# Patient Record
Sex: MALE | Race: WHITE | NOT HISPANIC OR LATINO | Employment: OTHER | URBAN - METROPOLITAN AREA
[De-identification: names, ages, dates, MRNs, and addresses within clinical notes are randomized per-mention and may not be internally consistent; named-entity substitution may affect disease eponyms.]

---

## 2017-07-13 ENCOUNTER — HISTORICAL (OUTPATIENT)
Dept: ADMINISTRATIVE | Facility: HOSPITAL | Age: 57
End: 2017-07-13

## 2017-07-13 LAB
ABS NEUT (OLG): 3.49 X10(3)/MCL (ref 2.1–9.2)
ALBUMIN SERPL-MCNC: 4.3 GM/DL (ref 3.4–5)
ALBUMIN/GLOB SERPL: 1.8 {RATIO}
ALP SERPL-CCNC: 36 UNIT/L (ref 50–136)
ALT SERPL-CCNC: 45 UNIT/L (ref 12–78)
AST SERPL-CCNC: 22 UNIT/L (ref 15–37)
BASOPHILS # BLD AUTO: 0 X10(3)/MCL (ref 0–0.2)
BASOPHILS NFR BLD AUTO: 0.8 %
BILIRUB SERPL-MCNC: 0.5 MG/DL (ref 0.2–1)
BILIRUBIN DIRECT+TOT PNL SERPL-MCNC: 0.1 MG/DL (ref 0–0.2)
BILIRUBIN DIRECT+TOT PNL SERPL-MCNC: 0.4 MG/DL (ref 0–0.8)
BUN SERPL-MCNC: 17 MG/DL (ref 7–18)
CALCIUM SERPL-MCNC: 9.4 MG/DL (ref 8.5–10.1)
CHLORIDE SERPL-SCNC: 109 MMOL/L (ref 98–107)
CO2 SERPL-SCNC: 28 MMOL/L (ref 21–32)
CREAT SERPL-MCNC: 1.04 MG/DL (ref 0.7–1.3)
EOSINOPHIL # BLD AUTO: 0.4 X10(3)/MCL (ref 0–0.9)
EOSINOPHIL NFR BLD AUTO: 6.4 %
ERYTHROCYTE [DISTWIDTH] IN BLOOD BY AUTOMATED COUNT: 13.2 % (ref 11.5–17)
GLOBULIN SER-MCNC: 2.4 GM/DL (ref 2.4–3.5)
GLUCOSE SERPL-MCNC: 124 MG/DL (ref 74–106)
HCT VFR BLD AUTO: 39.2 % (ref 42–52)
HGB BLD-MCNC: 13.2 GM/DL (ref 14–18)
LYMPHOCYTES # BLD AUTO: 1.9 X10(3)/MCL (ref 0.6–4.6)
LYMPHOCYTES NFR BLD AUTO: 29.4 %
MCH RBC QN AUTO: 31.1 PG (ref 27–31)
MCHC RBC AUTO-ENTMCNC: 33.7 GM/DL (ref 33–36)
MCV RBC AUTO: 92.2 FL (ref 80–94)
MONOCYTES # BLD AUTO: 0.6 X10(3)/MCL (ref 0.1–1.3)
MONOCYTES NFR BLD AUTO: 10 %
NEUTROPHILS # BLD AUTO: 3.5 X10(3)/MCL (ref 2.1–9.2)
NEUTROPHILS NFR BLD AUTO: 53.4 %
PLATELET # BLD AUTO: 182 X10(3)/MCL (ref 130–400)
PMV BLD AUTO: 10.2 FL (ref 9.4–12.4)
POTASSIUM SERPL-SCNC: 4.6 MMOL/L (ref 3.5–5.1)
PROT SERPL-MCNC: 6.7 GM/DL (ref 6.4–8.2)
PSA SERPL-MCNC: 0.13 NG/ML (ref 0–4)
RBC # BLD AUTO: 4.25 X10(6)/MCL (ref 4.7–6.1)
SODIUM SERPL-SCNC: 141 MMOL/L (ref 136–145)
WBC # SPEC AUTO: 6.5 X10(3)/MCL (ref 4.5–11.5)

## 2017-07-19 ENCOUNTER — HISTORICAL (OUTPATIENT)
Dept: ADMINISTRATIVE | Facility: HOSPITAL | Age: 57
End: 2017-07-19

## 2017-09-11 ENCOUNTER — HISTORICAL (OUTPATIENT)
Dept: INFUSION THERAPY | Facility: HOSPITAL | Age: 57
End: 2017-09-11

## 2017-09-11 LAB
ANION GAP SERPL CALC-SCNC: 16 MMOL/L
BUN SERPL-MCNC: 14 MG/DL (ref 7–18)
CHLORIDE SERPL-SCNC: 106 MMOL/L (ref 98–109)
CREAT SERPL-MCNC: 0.9 MG/DL (ref 0.6–1.3)
GLUCOSE SERPL-MCNC: 106 MG/DL (ref 70–105)
HCT VFR BLD CALC: 40 % (ref 38–51)
HGB BLD-MCNC: 13.6 MG/DL (ref 12–17)
POC IONIZED CALCIUM: 1.18 MMOL/L (ref 1.12–1.32)
POC TCO2: 24 MMOL/L (ref 22–27)
POTASSIUM BLD-SCNC: 3.8 MMOL/L (ref 3.5–4.9)
SODIUM BLD-SCNC: 141 MMOL/L (ref 138–146)

## 2017-10-27 ENCOUNTER — HISTORICAL (OUTPATIENT)
Dept: HEMATOLOGY/ONCOLOGY | Facility: CLINIC | Age: 57
End: 2017-10-27

## 2017-10-27 LAB
ABS NEUT (OLG): 4.1 X10(3)/MCL (ref 2.1–9.2)
ALBUMIN SERPL-MCNC: 4.5 GM/DL (ref 3.4–5)
ALBUMIN/GLOB SERPL: 1.7 {RATIO}
ALP SERPL-CCNC: 37 UNIT/L (ref 50–136)
ALT SERPL-CCNC: 40 UNIT/L (ref 12–78)
AST SERPL-CCNC: 20 UNIT/L (ref 15–37)
BASOPHILS # BLD AUTO: 0 X10(3)/MCL (ref 0–0.2)
BASOPHILS NFR BLD AUTO: 0.6 %
BILIRUB SERPL-MCNC: 0.5 MG/DL (ref 0.2–1)
BILIRUBIN DIRECT+TOT PNL SERPL-MCNC: 0.1 MG/DL (ref 0–0.2)
BILIRUBIN DIRECT+TOT PNL SERPL-MCNC: 0.4 MG/DL (ref 0–0.8)
BUN SERPL-MCNC: 22 MG/DL (ref 7–18)
CALCIUM SERPL-MCNC: 9.5 MG/DL (ref 8.5–10.1)
CHLORIDE SERPL-SCNC: 105 MMOL/L (ref 98–107)
CO2 SERPL-SCNC: 24 MMOL/L (ref 21–32)
CREAT SERPL-MCNC: 0.98 MG/DL (ref 0.7–1.3)
EOSINOPHIL # BLD AUTO: 0.7 X10(3)/MCL (ref 0–0.9)
EOSINOPHIL NFR BLD AUTO: 8.4 %
ERYTHROCYTE [DISTWIDTH] IN BLOOD BY AUTOMATED COUNT: 13.1 % (ref 11.5–17)
GLOBULIN SER-MCNC: 2.7 GM/DL (ref 2.4–3.5)
GLUCOSE SERPL-MCNC: 88 MG/DL (ref 74–106)
HCT VFR BLD AUTO: 40.1 % (ref 42–52)
HGB BLD-MCNC: 13.6 GM/DL (ref 14–18)
LYMPHOCYTES # BLD AUTO: 2.2 X10(3)/MCL (ref 0.6–4.6)
LYMPHOCYTES NFR BLD AUTO: 27.7 %
MCH RBC QN AUTO: 31 PG (ref 27–31)
MCHC RBC AUTO-ENTMCNC: 33.9 GM/DL (ref 33–36)
MCV RBC AUTO: 91.3 FL (ref 80–94)
MONOCYTES # BLD AUTO: 0.8 X10(3)/MCL (ref 0.1–1.3)
MONOCYTES NFR BLD AUTO: 10.9 %
NEUTROPHILS # BLD AUTO: 4.1 X10(3)/MCL (ref 2.1–9.2)
NEUTROPHILS NFR BLD AUTO: 52.4 %
PLATELET # BLD AUTO: 201 X10(3)/MCL (ref 130–400)
PMV BLD AUTO: 9.9 FL (ref 9.4–12.4)
POTASSIUM SERPL-SCNC: 4.2 MMOL/L (ref 3.5–5.1)
PROT SERPL-MCNC: 7.2 GM/DL (ref 6.4–8.2)
PSA SERPL-MCNC: 0.22 NG/ML (ref 0–4)
RBC # BLD AUTO: 4.39 X10(6)/MCL (ref 4.7–6.1)
SODIUM SERPL-SCNC: 139 MMOL/L (ref 136–145)
WBC # SPEC AUTO: 7.8 X10(3)/MCL (ref 4.5–11.5)

## 2017-12-05 ENCOUNTER — HISTORICAL (OUTPATIENT)
Dept: INFUSION THERAPY | Facility: HOSPITAL | Age: 57
End: 2017-12-05

## 2017-12-05 LAB
ANION GAP SERPL CALC-SCNC: 17 MMOL/L
BUN SERPL-MCNC: 19 MG/DL (ref 7–18)
CHLORIDE SERPL-SCNC: 106 MMOL/L (ref 98–109)
CREAT SERPL-MCNC: 1 MG/DL (ref 0.6–1.3)
GLUCOSE SERPL-MCNC: 147 MG/DL (ref 70–105)
HCT VFR BLD CALC: 37 % (ref 38–51)
HGB BLD-MCNC: 12.6 MG/DL (ref 12–17)
POC IONIZED CALCIUM: 1.21 MMOL/L (ref 1.12–1.32)
POC TCO2: 24 MMOL/L (ref 22–27)
POTASSIUM BLD-SCNC: 4 MMOL/L (ref 3.5–4.9)
SODIUM BLD-SCNC: 143 MMOL/L (ref 138–146)

## 2017-12-13 ENCOUNTER — HISTORICAL (OUTPATIENT)
Dept: RESPIRATORY THERAPY | Facility: HOSPITAL | Age: 57
End: 2017-12-13

## 2018-01-25 ENCOUNTER — HISTORICAL (OUTPATIENT)
Dept: HEMATOLOGY/ONCOLOGY | Facility: CLINIC | Age: 58
End: 2018-01-25

## 2018-01-25 LAB
ABS NEUT (OLG): 4 X10(3)/MCL (ref 2.1–9.2)
ALBUMIN SERPL-MCNC: 4.2 GM/DL (ref 3.4–5)
ALBUMIN/GLOB SERPL: 1.4 {RATIO}
ALP SERPL-CCNC: 42 UNIT/L (ref 50–136)
ALT SERPL-CCNC: 46 UNIT/L (ref 12–78)
AST SERPL-CCNC: 27 UNIT/L (ref 15–37)
BASOPHILS # BLD AUTO: 0 X10(3)/MCL (ref 0–0.2)
BASOPHILS NFR BLD AUTO: 0.6 %
BILIRUB SERPL-MCNC: 0.4 MG/DL (ref 0.2–1)
BILIRUBIN DIRECT+TOT PNL SERPL-MCNC: 0.1 MG/DL (ref 0–0.2)
BILIRUBIN DIRECT+TOT PNL SERPL-MCNC: 0.3 MG/DL (ref 0–0.8)
BUN SERPL-MCNC: 19 MG/DL (ref 7–18)
CALCIUM SERPL-MCNC: 8.8 MG/DL (ref 8.5–10.1)
CHLORIDE SERPL-SCNC: 107 MMOL/L (ref 98–107)
CO2 SERPL-SCNC: 22 MMOL/L (ref 21–32)
CREAT SERPL-MCNC: 1.14 MG/DL (ref 0.7–1.3)
EOSINOPHIL # BLD AUTO: 0.4 X10(3)/MCL (ref 0–0.9)
EOSINOPHIL NFR BLD AUTO: 6.1 %
ERYTHROCYTE [DISTWIDTH] IN BLOOD BY AUTOMATED COUNT: 13.3 % (ref 11.5–17)
GLOBULIN SER-MCNC: 3 GM/DL (ref 2.4–3.5)
GLUCOSE SERPL-MCNC: 128 MG/DL (ref 74–106)
HCT VFR BLD AUTO: 41.3 % (ref 42–52)
HGB BLD-MCNC: 13.8 GM/DL (ref 14–18)
LYMPHOCYTES # BLD AUTO: 2 X10(3)/MCL (ref 0.6–4.6)
LYMPHOCYTES NFR BLD AUTO: 29.4 %
MCH RBC QN AUTO: 30.7 PG (ref 27–31)
MCHC RBC AUTO-ENTMCNC: 33.4 GM/DL (ref 33–36)
MCV RBC AUTO: 91.8 FL (ref 80–94)
MONOCYTES # BLD AUTO: 0.4 X10(3)/MCL (ref 0.1–1.3)
MONOCYTES NFR BLD AUTO: 5.8 %
NEUTROPHILS # BLD AUTO: 4 X10(3)/MCL (ref 2.1–9.2)
NEUTROPHILS NFR BLD AUTO: 58.1 %
PLATELET # BLD AUTO: 211 X10(3)/MCL (ref 130–400)
PMV BLD AUTO: 9.8 FL (ref 9.4–12.4)
POTASSIUM SERPL-SCNC: 4.1 MMOL/L (ref 3.5–5.1)
PROT SERPL-MCNC: 7.2 GM/DL (ref 6.4–8.2)
PSA SERPL-MCNC: 0.38 NG/ML (ref 0–4)
RBC # BLD AUTO: 4.5 X10(6)/MCL (ref 4.7–6.1)
SODIUM SERPL-SCNC: 140 MMOL/L (ref 136–145)
WBC # SPEC AUTO: 6.9 X10(3)/MCL (ref 4.5–11.5)

## 2018-03-01 ENCOUNTER — HISTORICAL (OUTPATIENT)
Dept: HEMATOLOGY/ONCOLOGY | Facility: CLINIC | Age: 58
End: 2018-03-01

## 2018-03-01 LAB
ABS NEUT (OLG): 2.9 X10(3)/MCL (ref 2.1–9.2)
ALBUMIN SERPL-MCNC: 3.9 GM/DL (ref 3.4–5)
ALBUMIN/GLOB SERPL: 1.3 {RATIO}
ALP SERPL-CCNC: 37 UNIT/L (ref 50–136)
ALT SERPL-CCNC: 43 UNIT/L (ref 12–78)
AST SERPL-CCNC: 24 UNIT/L (ref 15–37)
BASOPHILS # BLD AUTO: 0 X10(3)/MCL (ref 0–0.2)
BASOPHILS NFR BLD AUTO: 0.7 %
BILIRUB SERPL-MCNC: 0.4 MG/DL (ref 0.2–1)
BILIRUBIN DIRECT+TOT PNL SERPL-MCNC: 0.1 MG/DL (ref 0–0.2)
BILIRUBIN DIRECT+TOT PNL SERPL-MCNC: 0.3 MG/DL (ref 0–0.8)
BUN SERPL-MCNC: 23 MG/DL (ref 7–18)
CALCIUM SERPL-MCNC: 8.9 MG/DL (ref 8.5–10.1)
CHLORIDE SERPL-SCNC: 106 MMOL/L (ref 98–107)
CO2 SERPL-SCNC: 23 MMOL/L (ref 21–32)
CREAT SERPL-MCNC: 1 MG/DL (ref 0.7–1.3)
EOSINOPHIL # BLD AUTO: 0.4 X10(3)/MCL (ref 0–0.9)
EOSINOPHIL NFR BLD AUTO: 7.7 %
ERYTHROCYTE [DISTWIDTH] IN BLOOD BY AUTOMATED COUNT: 13.3 % (ref 11.5–17)
GLOBULIN SER-MCNC: 3 GM/DL (ref 2.4–3.5)
GLUCOSE SERPL-MCNC: 115 MG/DL (ref 74–106)
HCT VFR BLD AUTO: 39.3 % (ref 42–52)
HGB BLD-MCNC: 12.9 GM/DL (ref 14–18)
LYMPHOCYTES # BLD AUTO: 1.9 X10(3)/MCL (ref 0.6–4.6)
LYMPHOCYTES NFR BLD AUTO: 32.6 %
MCH RBC QN AUTO: 30.1 PG (ref 27–31)
MCHC RBC AUTO-ENTMCNC: 32.8 GM/DL (ref 33–36)
MCV RBC AUTO: 91.6 FL (ref 80–94)
MONOCYTES # BLD AUTO: 0.5 X10(3)/MCL (ref 0.1–1.3)
MONOCYTES NFR BLD AUTO: 8.1 %
NEUTROPHILS # BLD AUTO: 2.9 X10(3)/MCL (ref 2.1–9.2)
NEUTROPHILS NFR BLD AUTO: 50.9 %
PLATELET # BLD AUTO: 183 X10(3)/MCL (ref 130–400)
PMV BLD AUTO: 9.8 FL (ref 9.4–12.4)
POC CREATININE: 1 MG/DL (ref 0.6–1.3)
POTASSIUM SERPL-SCNC: 4.4 MMOL/L (ref 3.5–5.1)
PROT SERPL-MCNC: 6.9 GM/DL (ref 6.4–8.2)
PSA SERPL-MCNC: 0.52 NG/ML (ref 0–4)
RBC # BLD AUTO: 4.29 X10(6)/MCL (ref 4.7–6.1)
SODIUM SERPL-SCNC: 137 MMOL/L (ref 136–145)
WBC # SPEC AUTO: 5.7 X10(3)/MCL (ref 4.5–11.5)

## 2018-03-07 ENCOUNTER — HISTORICAL (OUTPATIENT)
Dept: INFUSION THERAPY | Facility: HOSPITAL | Age: 58
End: 2018-03-07

## 2018-03-20 ENCOUNTER — HISTORICAL (OUTPATIENT)
Dept: RADIOLOGY | Facility: HOSPITAL | Age: 58
End: 2018-03-20

## 2018-06-05 ENCOUNTER — HISTORICAL (OUTPATIENT)
Dept: INFUSION THERAPY | Facility: HOSPITAL | Age: 58
End: 2018-06-05

## 2018-06-05 LAB
ANION GAP SERPL CALC-SCNC: 18 MMOL/L
BUN SERPL-MCNC: 23 MG/DL (ref 7–18)
CHLORIDE SERPL-SCNC: 105 MMOL/L (ref 98–109)
CREAT SERPL-MCNC: 1.1 MG/DL (ref 0.6–1.3)
GLUCOSE SERPL-MCNC: 152 MG/DL (ref 70–105)
HCT VFR BLD CALC: 34 % (ref 38–51)
HGB BLD-MCNC: 11.6 MG/DL (ref 12–17)
POC IONIZED CALCIUM: 1.15 MMOL/L (ref 1.12–1.32)
POC TCO2: 23 MMOL/L (ref 22–27)
POTASSIUM BLD-SCNC: 3.6 MMOL/L (ref 3.5–4.9)
SODIUM BLD-SCNC: 141 MMOL/L (ref 138–146)

## 2018-06-19 ENCOUNTER — HISTORICAL (OUTPATIENT)
Dept: ADMINISTRATIVE | Facility: HOSPITAL | Age: 58
End: 2018-06-19

## 2018-07-13 ENCOUNTER — HISTORICAL (OUTPATIENT)
Dept: HEMATOLOGY/ONCOLOGY | Facility: CLINIC | Age: 58
End: 2018-07-13

## 2018-07-13 LAB
ABS NEUT (OLG): 2.51 X10(3)/MCL (ref 2.1–9.2)
ALBUMIN SERPL-MCNC: 3.7 GM/DL (ref 3.4–5)
ALBUMIN/GLOB SERPL: 1.3 RATIO (ref 1.1–2)
ALP SERPL-CCNC: 44 UNIT/L (ref 50–136)
ALT SERPL-CCNC: 57 UNIT/L (ref 12–78)
AST SERPL-CCNC: 31 UNIT/L (ref 15–37)
BASOPHILS # BLD AUTO: 0 X10(3)/MCL (ref 0–0.2)
BASOPHILS NFR BLD AUTO: 0.8 %
BILIRUB SERPL-MCNC: 0.4 MG/DL (ref 0.2–1)
BILIRUBIN DIRECT+TOT PNL SERPL-MCNC: 0.1 MG/DL (ref 0–0.5)
BILIRUBIN DIRECT+TOT PNL SERPL-MCNC: 0.3 MG/DL (ref 0–0.8)
BUN SERPL-MCNC: 17 MG/DL (ref 7–18)
CALCIUM SERPL-MCNC: 9.2 MG/DL (ref 8.5–10.1)
CHLORIDE SERPL-SCNC: 110 MMOL/L (ref 98–107)
CO2 SERPL-SCNC: 24 MMOL/L (ref 21–32)
CREAT SERPL-MCNC: 0.94 MG/DL (ref 0.7–1.3)
EOSINOPHIL # BLD AUTO: 0.3 X10(3)/MCL (ref 0–0.9)
EOSINOPHIL NFR BLD AUTO: 6.8 %
ERYTHROCYTE [DISTWIDTH] IN BLOOD BY AUTOMATED COUNT: 13.9 % (ref 11.5–17)
GLOBULIN SER-MCNC: 2.9 GM/DL (ref 2.4–3.5)
GLUCOSE SERPL-MCNC: 96 MG/DL (ref 74–106)
HCT VFR BLD AUTO: 36.8 % (ref 42–52)
HGB BLD-MCNC: 12.3 GM/DL (ref 14–18)
LYMPHOCYTES # BLD AUTO: 1.3 X10(3)/MCL (ref 0.6–4.6)
LYMPHOCYTES NFR BLD AUTO: 28.2 %
MCH RBC QN AUTO: 30.8 PG (ref 27–31)
MCHC RBC AUTO-ENTMCNC: 33.4 GM/DL (ref 33–36)
MCV RBC AUTO: 92.2 FL (ref 80–94)
MONOCYTES # BLD AUTO: 0.5 X10(3)/MCL (ref 0.1–1.3)
MONOCYTES NFR BLD AUTO: 10.8 %
NEUTROPHILS # BLD AUTO: 2.5 X10(3)/MCL (ref 2.1–9.2)
NEUTROPHILS NFR BLD AUTO: 53.4 %
PLATELET # BLD AUTO: 187 X10(3)/MCL (ref 130–400)
PMV BLD AUTO: 9.5 FL (ref 9.4–12.4)
POTASSIUM SERPL-SCNC: 4.2 MMOL/L (ref 3.5–5.1)
PROT SERPL-MCNC: 6.6 GM/DL (ref 6.4–8.2)
PSA SERPL-MCNC: 1.04 NG/ML (ref 0–4)
RBC # BLD AUTO: 3.99 X10(6)/MCL (ref 4.7–6.1)
SODIUM SERPL-SCNC: 143 MMOL/L (ref 136–145)
WBC # SPEC AUTO: 4.7 X10(3)/MCL (ref 4.5–11.5)

## 2018-08-13 ENCOUNTER — HISTORICAL (OUTPATIENT)
Dept: ADMINISTRATIVE | Facility: HOSPITAL | Age: 58
End: 2018-08-13

## 2018-09-05 ENCOUNTER — HISTORICAL (OUTPATIENT)
Dept: INFUSION THERAPY | Facility: HOSPITAL | Age: 58
End: 2018-09-05

## 2018-09-05 LAB
ABS NEUT (OLG): 1.86 X10(3)/MCL (ref 2.1–9.2)
ALBUMIN SERPL-MCNC: 3.6 GM/DL (ref 3.4–5)
ALBUMIN/GLOB SERPL: 1.4 {RATIO}
ALP SERPL-CCNC: 36 UNIT/L (ref 50–136)
ALT SERPL-CCNC: 58 UNIT/L (ref 12–78)
ANION GAP SERPL CALC-SCNC: 16 MMOL/L
AST SERPL-CCNC: 28 UNIT/L (ref 15–37)
BASOPHILS # BLD AUTO: 0 X10(3)/MCL (ref 0–0.2)
BASOPHILS NFR BLD AUTO: 0.9 %
BILIRUB SERPL-MCNC: 0.5 MG/DL (ref 0.2–1)
BILIRUBIN DIRECT+TOT PNL SERPL-MCNC: 0.1 MG/DL (ref 0–0.2)
BILIRUBIN DIRECT+TOT PNL SERPL-MCNC: 0.4 MG/DL (ref 0–0.8)
BUN SERPL-MCNC: 11 MG/DL (ref 8–26)
BUN SERPL-MCNC: 13 MG/DL (ref 7–18)
CALCIUM SERPL-MCNC: 8.4 MG/DL (ref 8.5–10.1)
CHLORIDE SERPL-SCNC: 105 MMOL/L (ref 98–109)
CHLORIDE SERPL-SCNC: 107 MMOL/L (ref 98–107)
CO2 SERPL-SCNC: 25 MMOL/L (ref 21–32)
CREAT SERPL-MCNC: 1.1 MG/DL (ref 0.6–1.3)
CREAT SERPL-MCNC: 1.14 MG/DL (ref 0.7–1.3)
EOSINOPHIL # BLD AUTO: 0.2 X10(3)/MCL (ref 0–0.9)
EOSINOPHIL NFR BLD AUTO: 5.2 %
ERYTHROCYTE [DISTWIDTH] IN BLOOD BY AUTOMATED COUNT: 13.7 % (ref 11.5–17)
GLOBULIN SER-MCNC: 2.5 GM/DL (ref 2.4–3.5)
GLUCOSE SERPL-MCNC: 119 MG/DL (ref 70–105)
GLUCOSE SERPL-MCNC: 121 MG/DL (ref 74–106)
HCT VFR BLD AUTO: 35.7 % (ref 42–52)
HCT VFR BLD CALC: 34 % (ref 38–51)
HGB BLD-MCNC: 11.6 MG/DL (ref 12–17)
HGB BLD-MCNC: 12.1 GM/DL (ref 14–18)
LYMPHOCYTES # BLD AUTO: 1 X10(3)/MCL (ref 0.6–4.6)
LYMPHOCYTES NFR BLD AUTO: 30.3 %
MCH RBC QN AUTO: 30.9 PG (ref 27–31)
MCHC RBC AUTO-ENTMCNC: 33.9 GM/DL (ref 33–36)
MCV RBC AUTO: 91.3 FL (ref 80–94)
MONOCYTES # BLD AUTO: 0.3 X10(3)/MCL (ref 0.1–1.3)
MONOCYTES NFR BLD AUTO: 9.8 %
NEUTROPHILS # BLD AUTO: 1.9 X10(3)/MCL (ref 2.1–9.2)
NEUTROPHILS NFR BLD AUTO: 53.8 %
PLATELET # BLD AUTO: 186 X10(3)/MCL (ref 130–400)
PMV BLD AUTO: 9.7 FL (ref 9.4–12.4)
POC IONIZED CALCIUM: 1.1 MMOL/L (ref 1.12–1.32)
POC TCO2: 23 MMOL/L (ref 24–29)
POTASSIUM BLD-SCNC: 3.4 MMOL/L (ref 3.5–4.9)
POTASSIUM SERPL-SCNC: 3.5 MMOL/L (ref 3.5–5.1)
PROT SERPL-MCNC: 6.1 GM/DL (ref 6.4–8.2)
PSA SERPL-MCNC: 1.34 NG/ML (ref 0–4)
RBC # BLD AUTO: 3.91 X10(6)/MCL (ref 4.7–6.1)
SODIUM BLD-SCNC: 141 MMOL/L (ref 138–146)
SODIUM SERPL-SCNC: 141 MMOL/L (ref 136–145)
WBC # SPEC AUTO: 3.5 X10(3)/MCL (ref 4.5–11.5)

## 2018-10-16 ENCOUNTER — HISTORICAL (OUTPATIENT)
Dept: LAB | Facility: HOSPITAL | Age: 58
End: 2018-10-16

## 2018-12-04 ENCOUNTER — HISTORICAL (OUTPATIENT)
Dept: ADMINISTRATIVE | Facility: HOSPITAL | Age: 58
End: 2018-12-04

## 2018-12-04 LAB — POC CREATININE: 1 MG/DL (ref 0.6–1.3)

## 2018-12-05 ENCOUNTER — HISTORICAL (OUTPATIENT)
Dept: INFUSION THERAPY | Facility: HOSPITAL | Age: 58
End: 2018-12-05

## 2018-12-05 LAB
ANION GAP SERPL CALC-SCNC: 18 MMOL/L
BUN SERPL-MCNC: 16 MG/DL (ref 8–26)
CHLORIDE SERPL-SCNC: 106 MMOL/L (ref 98–109)
CREAT SERPL-MCNC: 1 MG/DL (ref 0.6–1.3)
GLUCOSE SERPL-MCNC: 121 MG/DL (ref 70–105)
HCT VFR BLD CALC: 36 % (ref 38–51)
HGB BLD-MCNC: 12.2 MG/DL (ref 12–17)
POC IONIZED CALCIUM: 1.15 MMOL/L (ref 1.12–1.32)
POC TCO2: 22 MMOL/L (ref 24–29)
POTASSIUM BLD-SCNC: 3.9 MMOL/L (ref 3.5–4.9)
SODIUM BLD-SCNC: 141 MMOL/L (ref 138–146)

## 2018-12-10 ENCOUNTER — HISTORICAL (OUTPATIENT)
Dept: HEMATOLOGY/ONCOLOGY | Facility: CLINIC | Age: 58
End: 2018-12-10

## 2018-12-10 LAB
ABS NEUT (OLG): 3.32 X10(3)/MCL (ref 2.1–9.2)
ALBUMIN SERPL-MCNC: 4 GM/DL (ref 3.4–5)
ALBUMIN/GLOB SERPL: 1.5 {RATIO}
ALP SERPL-CCNC: 39 UNIT/L (ref 50–136)
ALT SERPL-CCNC: 49 UNIT/L (ref 12–78)
AST SERPL-CCNC: 29 UNIT/L (ref 15–37)
BASOPHILS # BLD AUTO: 0 X10(3)/MCL (ref 0–0.2)
BASOPHILS NFR BLD AUTO: 0.8 %
BILIRUB SERPL-MCNC: 0.3 MG/DL (ref 0.2–1)
BILIRUBIN DIRECT+TOT PNL SERPL-MCNC: 0.1 MG/DL (ref 0–0.2)
BILIRUBIN DIRECT+TOT PNL SERPL-MCNC: 0.2 MG/DL (ref 0–0.8)
BUN SERPL-MCNC: 14 MG/DL (ref 7–18)
CALCIUM SERPL-MCNC: 8.6 MG/DL (ref 8.5–10.1)
CHLORIDE SERPL-SCNC: 109 MMOL/L (ref 98–107)
CO2 SERPL-SCNC: 24 MMOL/L (ref 21–32)
CREAT SERPL-MCNC: 1.09 MG/DL (ref 0.7–1.3)
EOSINOPHIL # BLD AUTO: 0.3 X10(3)/MCL (ref 0–0.9)
EOSINOPHIL NFR BLD AUTO: 5.3 %
ERYTHROCYTE [DISTWIDTH] IN BLOOD BY AUTOMATED COUNT: 13 % (ref 11.5–17)
GLOBULIN SER-MCNC: 2.7 GM/DL (ref 2.4–3.5)
GLUCOSE SERPL-MCNC: 119 MG/DL (ref 74–106)
HCT VFR BLD AUTO: 38.7 % (ref 42–52)
HGB BLD-MCNC: 12.9 GM/DL (ref 14–18)
LYMPHOCYTES # BLD AUTO: 1.2 X10(3)/MCL (ref 0.6–4.6)
LYMPHOCYTES NFR BLD AUTO: 23.5 %
MCH RBC QN AUTO: 31.5 PG (ref 27–31)
MCHC RBC AUTO-ENTMCNC: 33.3 GM/DL (ref 33–36)
MCV RBC AUTO: 94.4 FL (ref 80–94)
MONOCYTES # BLD AUTO: 0.4 X10(3)/MCL (ref 0.1–1.3)
MONOCYTES NFR BLD AUTO: 6.8 %
NEUTROPHILS # BLD AUTO: 3.3 X10(3)/MCL (ref 2.1–9.2)
NEUTROPHILS NFR BLD AUTO: 63 %
PLATELET # BLD AUTO: 205 X10(3)/MCL (ref 130–400)
PMV BLD AUTO: 9.4 FL (ref 9.4–12.4)
POTASSIUM SERPL-SCNC: 3.8 MMOL/L (ref 3.5–5.1)
PROT SERPL-MCNC: 6.7 GM/DL (ref 6.4–8.2)
PSA SERPL-MCNC: 1.84 NG/ML (ref 0–4)
RBC # BLD AUTO: 4.1 X10(6)/MCL (ref 4.7–6.1)
SODIUM SERPL-SCNC: 141 MMOL/L (ref 136–145)
WBC # SPEC AUTO: 5.3 X10(3)/MCL (ref 4.5–11.5)

## 2019-03-06 ENCOUNTER — HISTORICAL (OUTPATIENT)
Dept: INFUSION THERAPY | Facility: HOSPITAL | Age: 59
End: 2019-03-06

## 2019-03-06 LAB
ABS NEUT (OLG): 3.11 X10(3)/MCL (ref 2.1–9.2)
ALBUMIN SERPL-MCNC: 3.8 GM/DL (ref 3.4–5)
ALP SERPL-CCNC: 41 UNIT/L (ref 50–136)
ALT SERPL-CCNC: 48 UNIT/L (ref 12–78)
ANION GAP SERPL CALC-SCNC: 16 MMOL/L
AST SERPL-CCNC: 27 UNIT/L (ref 15–37)
BASOPHILS # BLD AUTO: 0 X10(3)/MCL (ref 0–0.2)
BASOPHILS NFR BLD AUTO: 0.6 %
BILIRUB SERPL-MCNC: 0.3 MG/DL (ref 0.2–1)
BILIRUBIN DIRECT+TOT PNL SERPL-MCNC: 0.1 MG/DL (ref 0–0.2)
BILIRUBIN DIRECT+TOT PNL SERPL-MCNC: 0.2 MG/DL (ref 0–0.8)
BUN SERPL-MCNC: 12 MG/DL (ref 8–26)
CHLORIDE SERPL-SCNC: 107 MMOL/L (ref 98–109)
CREAT SERPL-MCNC: 0.8 MG/DL (ref 0.6–1.3)
EOSINOPHIL # BLD AUTO: 0.3 X10(3)/MCL (ref 0–0.9)
EOSINOPHIL NFR BLD AUTO: 5.6 %
ERYTHROCYTE [DISTWIDTH] IN BLOOD BY AUTOMATED COUNT: 13.4 % (ref 11.5–17)
GLUCOSE SERPL-MCNC: 118 MG/DL (ref 70–105)
HCT VFR BLD AUTO: 39.5 % (ref 42–52)
HCT VFR BLD CALC: 38 % (ref 38–51)
HGB BLD-MCNC: 12.9 MG/DL (ref 12–17)
HGB BLD-MCNC: 13.2 GM/DL (ref 14–18)
LIVER PROFILE INTERP: ABNORMAL
LYMPHOCYTES # BLD AUTO: 1.3 X10(3)/MCL (ref 0.6–4.6)
LYMPHOCYTES NFR BLD AUTO: 25.6 %
MCH RBC QN AUTO: 31.1 PG (ref 27–31)
MCHC RBC AUTO-ENTMCNC: 33.4 GM/DL (ref 33–36)
MCV RBC AUTO: 93.2 FL (ref 80–94)
MONOCYTES # BLD AUTO: 0.4 X10(3)/MCL (ref 0.1–1.3)
MONOCYTES NFR BLD AUTO: 7.8 %
NEUTROPHILS # BLD AUTO: 3.1 X10(3)/MCL (ref 2.1–9.2)
NEUTROPHILS NFR BLD AUTO: 60.2 %
PLATELET # BLD AUTO: 184 X10(3)/MCL (ref 130–400)
PMV BLD AUTO: 9 FL (ref 9.4–12.4)
POC IONIZED CALCIUM: 1.13 MMOL/L (ref 1.12–1.32)
POC TCO2: 23 MMOL/L (ref 24–29)
POTASSIUM BLD-SCNC: 3.8 MMOL/L (ref 3.5–4.9)
PROT SERPL-MCNC: 6.5 GM/DL (ref 6.4–8.2)
PSA SERPL-MCNC: 0.41 NG/ML (ref 0–4)
RBC # BLD AUTO: 4.24 X10(6)/MCL (ref 4.7–6.1)
SODIUM BLD-SCNC: 141 MMOL/L (ref 138–146)
WBC # SPEC AUTO: 5.2 X10(3)/MCL (ref 4.5–11.5)

## 2019-04-08 ENCOUNTER — HISTORICAL (OUTPATIENT)
Dept: LAB | Facility: HOSPITAL | Age: 59
End: 2019-04-08

## 2019-06-05 ENCOUNTER — HISTORICAL (OUTPATIENT)
Dept: INFUSION THERAPY | Facility: HOSPITAL | Age: 59
End: 2019-06-05

## 2019-06-05 LAB
ABS NEUT (OLG): 2.29 X10(3)/MCL (ref 2.1–9.2)
ALBUMIN SERPL-MCNC: 3.7 GM/DL (ref 3.4–5)
ALP SERPL-CCNC: 41 UNIT/L (ref 50–136)
ALT SERPL-CCNC: 43 UNIT/L (ref 12–78)
ANION GAP SERPL CALC-SCNC: 18 MMOL/L
AST SERPL-CCNC: 24 UNIT/L (ref 15–37)
BASOPHILS # BLD AUTO: 0 X10(3)/MCL (ref 0–0.2)
BASOPHILS NFR BLD AUTO: 0.9 %
BILIRUB SERPL-MCNC: 0.5 MG/DL (ref 0.2–1)
BILIRUBIN DIRECT+TOT PNL SERPL-MCNC: 0.1 MG/DL (ref 0–0.2)
BILIRUBIN DIRECT+TOT PNL SERPL-MCNC: 0.4 MG/DL (ref 0–0.8)
BUN SERPL-MCNC: 15 MG/DL (ref 8–26)
CHLORIDE SERPL-SCNC: 104 MMOL/L (ref 98–109)
CREAT SERPL-MCNC: 0.9 MG/DL (ref 0.6–1.3)
EOSINOPHIL # BLD AUTO: 0.2 X10(3)/MCL (ref 0–0.9)
EOSINOPHIL NFR BLD AUTO: 5.7 %
EOSINOPHIL NFR BLD MANUAL: 4 % (ref 0–8)
ERYTHROCYTE [DISTWIDTH] IN BLOOD BY AUTOMATED COUNT: 13 % (ref 11.5–17)
GLUCOSE SERPL-MCNC: 127 MG/DL (ref 70–105)
HCT VFR BLD AUTO: 39.8 % (ref 42–52)
HCT VFR BLD CALC: 39 % (ref 38–51)
HGB BLD-MCNC: 13.2 GM/DL (ref 14–18)
HGB BLD-MCNC: 13.3 MG/DL (ref 12–17)
LIVER PROFILE INTERP: ABNORMAL
LYMPHOCYTES # BLD AUTO: 1.4 X10(3)/MCL (ref 0.6–4.6)
LYMPHOCYTES NFR BLD AUTO: 32.8 %
LYMPHOCYTES NFR BLD MANUAL: 2 /MCL
LYMPHOCYTES NFR BLD MANUAL: 32 % (ref 13–40)
MCH RBC QN AUTO: 31.4 PG (ref 27–31)
MCHC RBC AUTO-ENTMCNC: 33.2 GM/DL (ref 33–36)
MCV RBC AUTO: 94.8 FL (ref 80–94)
MONOCYTES # BLD AUTO: 0.3 X10(3)/MCL (ref 0.1–1.3)
MONOCYTES NFR BLD AUTO: 7.8 %
MONOCYTES NFR BLD MANUAL: 8 % (ref 2–11)
NEUTROPHILS # BLD AUTO: 2.3 X10(3)/MCL (ref 2.1–9.2)
NEUTROPHILS NFR BLD AUTO: 52.6 %
NEUTROPHILS NFR BLD MANUAL: 56 % (ref 47–80)
PLATELET # BLD AUTO: 199 X10(3)/MCL (ref 130–400)
PLATELET # BLD EST: NORMAL 10*3/UL
PMV BLD AUTO: 9.7 FL (ref 9.4–12.4)
POC IONIZED CALCIUM: 1.2 MMOL/L (ref 1.12–1.32)
POC TCO2: 24 MMOL/L (ref 24–29)
POTASSIUM BLD-SCNC: 3.7 MMOL/L (ref 3.5–4.9)
PROT SERPL-MCNC: 6.4 GM/DL (ref 6.4–8.2)
PSA SERPL-MCNC: 0.2 NG/ML (ref 0–4)
RBC # BLD AUTO: 4.2 X10(6)/MCL (ref 4.7–6.1)
RBC MORPH BLD: NORMAL
SODIUM BLD-SCNC: 141 MMOL/L (ref 138–146)
WBC # SPEC AUTO: 4.4 X10(3)/MCL (ref 4.5–11.5)

## 2019-06-12 ENCOUNTER — HISTORICAL (OUTPATIENT)
Dept: HEMATOLOGY/ONCOLOGY | Facility: CLINIC | Age: 59
End: 2019-06-12

## 2019-07-05 ENCOUNTER — HISTORICAL (OUTPATIENT)
Dept: RADIOLOGY | Facility: HOSPITAL | Age: 59
End: 2019-07-05

## 2019-09-04 ENCOUNTER — HISTORICAL (OUTPATIENT)
Dept: INFUSION THERAPY | Facility: HOSPITAL | Age: 59
End: 2019-09-04

## 2019-09-04 LAB
ABS NEUT (OLG): 1.69 X10(3)/MCL (ref 2.1–9.2)
ALBUMIN SERPL-MCNC: 3.7 GM/DL (ref 3.4–5)
ALP SERPL-CCNC: 31 UNIT/L (ref 50–136)
ALT SERPL-CCNC: 36 UNIT/L (ref 12–78)
ANION GAP SERPL CALC-SCNC: 15 MMOL/L
AST SERPL-CCNC: 25 UNIT/L (ref 15–37)
BASOPHILS # BLD AUTO: 0 X10(3)/MCL (ref 0–0.2)
BASOPHILS NFR BLD AUTO: 1 %
BILIRUB SERPL-MCNC: 0.7 MG/DL (ref 0.2–1)
BILIRUBIN DIRECT+TOT PNL SERPL-MCNC: 0.1 MG/DL (ref 0–0.2)
BILIRUBIN DIRECT+TOT PNL SERPL-MCNC: 0.6 MG/DL (ref 0–0.8)
BUN SERPL-MCNC: 13 MG/DL (ref 8–26)
CHLORIDE SERPL-SCNC: 107 MMOL/L (ref 98–109)
CREAT SERPL-MCNC: 0.9 MG/DL (ref 0.6–1.3)
EOSINOPHIL # BLD AUTO: 0.3 X10(3)/MCL (ref 0–0.9)
EOSINOPHIL NFR BLD AUTO: 8.6 %
ERYTHROCYTE [DISTWIDTH] IN BLOOD BY AUTOMATED COUNT: 12.8 % (ref 11.5–17)
GLUCOSE SERPL-MCNC: 116 MG/DL (ref 70–105)
HCT VFR BLD AUTO: 36.3 % (ref 42–52)
HCT VFR BLD CALC: 35 % (ref 38–51)
HGB BLD-MCNC: 11.9 MG/DL (ref 12–17)
HGB BLD-MCNC: 12.4 GM/DL (ref 14–18)
LIVER PROFILE INTERP: ABNORMAL
LYMPHOCYTES # BLD AUTO: 1.5 X10(3)/MCL (ref 0.6–4.6)
LYMPHOCYTES NFR BLD AUTO: 38.2 %
MCH RBC QN AUTO: 32 PG (ref 27–31)
MCHC RBC AUTO-ENTMCNC: 34.2 GM/DL (ref 33–36)
MCV RBC AUTO: 93.8 FL (ref 80–94)
MONOCYTES # BLD AUTO: 0.4 X10(3)/MCL (ref 0.1–1.3)
MONOCYTES NFR BLD AUTO: 8.9 %
NEUTROPHILS # BLD AUTO: 1.7 X10(3)/MCL (ref 2.1–9.2)
NEUTROPHILS NFR BLD AUTO: 42.8 %
PLATELET # BLD AUTO: 219 X10(3)/MCL (ref 130–400)
PMV BLD AUTO: 9.3 FL (ref 9.4–12.4)
POC IONIZED CALCIUM: 1.13 MMOL/L (ref 1.12–1.32)
POC TCO2: 23 MMOL/L (ref 24–29)
POTASSIUM BLD-SCNC: 3.6 MMOL/L (ref 3.5–4.9)
PROT SERPL-MCNC: 5.9 GM/DL (ref 6.4–8.2)
PSA SERPL-MCNC: 0.09 NG/ML (ref 0–4)
RBC # BLD AUTO: 3.87 X10(6)/MCL (ref 4.7–6.1)
SODIUM BLD-SCNC: 141 MMOL/L (ref 138–146)
WBC # SPEC AUTO: 4 X10(3)/MCL (ref 4.5–11.5)

## 2019-12-04 ENCOUNTER — HISTORICAL (OUTPATIENT)
Dept: INFUSION THERAPY | Facility: HOSPITAL | Age: 59
End: 2019-12-04

## 2019-12-04 LAB
ABS NEUT (OLG): 2.31 X10(3)/MCL (ref 2.1–9.2)
ALBUMIN SERPL-MCNC: 3.6 GM/DL (ref 3.4–5)
ALP SERPL-CCNC: 42 UNIT/L (ref 50–136)
ALT SERPL-CCNC: 33 UNIT/L (ref 12–78)
ANION GAP SERPL CALC-SCNC: 14 MMOL/L
ANION GAP SERPL CALC-SCNC: NORMAL MMOL/L
AST SERPL-CCNC: 17 UNIT/L (ref 15–37)
BASOPHILS # BLD AUTO: 0 X10(3)/MCL (ref 0–0.2)
BASOPHILS NFR BLD AUTO: 1 %
BILIRUB SERPL-MCNC: 0.3 MG/DL (ref 0.2–1)
BILIRUBIN DIRECT+TOT PNL SERPL-MCNC: 0.1 MG/DL (ref 0–0.5)
BILIRUBIN DIRECT+TOT PNL SERPL-MCNC: 0.2 MG/DL (ref 0–0.8)
BUN SERPL-MCNC: 15 MG/DL (ref 8–26)
BUN SERPL-MCNC: NORMAL MG/DL (ref 8–26)
CHLORIDE SERPL-SCNC: 108 MMOL/L (ref 98–109)
CHLORIDE SERPL-SCNC: NORMAL MMOL/L (ref 98–109)
CREAT SERPL-MCNC: 0.9 MG/DL (ref 0.6–1.3)
CREAT SERPL-MCNC: NORMAL MG/DL (ref 0.6–1.3)
EOSINOPHIL # BLD AUTO: 0.4 X10(3)/MCL (ref 0–0.9)
EOSINOPHIL NFR BLD AUTO: 8.8 %
ERYTHROCYTE [DISTWIDTH] IN BLOOD BY AUTOMATED COUNT: 12.4 % (ref 11.5–17)
GLUCOSE SERPL-MCNC: 126 MG/DL (ref 70–105)
GLUCOSE SERPL-MCNC: NORMAL MG/DL (ref 70–105)
HCT VFR BLD AUTO: 40.5 % (ref 42–52)
HCT VFR BLD CALC: 38 % (ref 38–51)
HCT VFR BLD CALC: NORMAL % (ref 38–51)
HGB BLD-MCNC: 12.9 MG/DL (ref 12–17)
HGB BLD-MCNC: 13.6 GM/DL (ref 14–18)
HGB BLD-MCNC: NORMAL MG/DL (ref 12–17)
LIVER PROFILE INTERP: ABNORMAL
LYMPHOCYTES # BLD AUTO: 1.7 X10(3)/MCL (ref 0.6–4.6)
LYMPHOCYTES NFR BLD AUTO: 35 %
MCH RBC QN AUTO: 32.2 PG (ref 27–31)
MCHC RBC AUTO-ENTMCNC: 33.6 GM/DL (ref 33–36)
MCV RBC AUTO: 96 FL (ref 80–94)
MONOCYTES # BLD AUTO: 0.4 X10(3)/MCL (ref 0.1–1.3)
MONOCYTES NFR BLD AUTO: 7.4 %
NEUTROPHILS # BLD AUTO: 2.3 X10(3)/MCL (ref 2.1–9.2)
NEUTROPHILS NFR BLD AUTO: 47.2 %
PLATELET # BLD AUTO: 208 X10(3)/MCL (ref 130–400)
PMV BLD AUTO: 9.8 FL (ref 9.4–12.4)
POC IONIZED CALCIUM: 1.19 MMOL/L (ref 1.12–1.32)
POC IONIZED CALCIUM: NORMAL MMOL/L (ref 1.12–1.32)
POC TCO2: 23 MMOL/L (ref 24–29)
POC TCO2: NORMAL MMOL/L (ref 24–29)
POTASSIUM BLD-SCNC: 3.7 MMOL/L (ref 3.5–4.9)
POTASSIUM BLD-SCNC: NORMAL MMOL/L (ref 3.5–4.9)
PROT SERPL-MCNC: 6.5 GM/DL (ref 6.4–8.2)
PSA SERPL-MCNC: 0.06 NG/ML (ref 0–4)
RBC # BLD AUTO: 4.22 X10(6)/MCL (ref 4.7–6.1)
SODIUM BLD-SCNC: 140 MMOL/L (ref 138–146)
SODIUM BLD-SCNC: NORMAL MMOL/L (ref 138–146)
WBC # SPEC AUTO: 4.9 X10(3)/MCL (ref 4.5–11.5)

## 2020-03-03 ENCOUNTER — HISTORICAL (OUTPATIENT)
Dept: RADIOLOGY | Facility: HOSPITAL | Age: 60
End: 2020-03-03

## 2020-03-04 ENCOUNTER — HISTORICAL (OUTPATIENT)
Dept: INFUSION THERAPY | Facility: HOSPITAL | Age: 60
End: 2020-03-04

## 2020-03-04 LAB
ABS NEUT (OLG): 2.07 X10(3)/MCL (ref 2.1–9.2)
ALBUMIN SERPL-MCNC: 3.8 GM/DL (ref 3.4–5)
ALP SERPL-CCNC: 36 UNIT/L (ref 50–136)
ALT SERPL-CCNC: 39 UNIT/L (ref 12–78)
ANION GAP SERPL CALC-SCNC: 15 MMOL/L
AST SERPL-CCNC: 22 UNIT/L (ref 15–37)
BASOPHILS # BLD AUTO: 0 X10(3)/MCL (ref 0–0.2)
BASOPHILS NFR BLD AUTO: 0.9 %
BILIRUB SERPL-MCNC: 0.4 MG/DL (ref 0.2–1)
BILIRUBIN DIRECT+TOT PNL SERPL-MCNC: 0.1 MG/DL (ref 0–0.2)
BILIRUBIN DIRECT+TOT PNL SERPL-MCNC: 0.3 MG/DL (ref 0–0.8)
BUN SERPL-MCNC: 16 MG/DL (ref 8–26)
CHLORIDE SERPL-SCNC: 107 MMOL/L (ref 98–109)
CREAT SERPL-MCNC: 1 MG/DL (ref 0.6–1.3)
EOSINOPHIL # BLD AUTO: 0.3 X10(3)/MCL (ref 0–0.9)
EOSINOPHIL NFR BLD AUTO: 6.9 %
ERYTHROCYTE [DISTWIDTH] IN BLOOD BY AUTOMATED COUNT: 12.6 % (ref 11.5–17)
GLUCOSE SERPL-MCNC: 127 MG/DL (ref 70–105)
HCT VFR BLD AUTO: 40.5 % (ref 42–52)
HCT VFR BLD CALC: 39 % (ref 38–51)
HGB BLD-MCNC: 13.3 MG/DL (ref 12–17)
HGB BLD-MCNC: 13.5 GM/DL (ref 14–18)
LIVER PROFILE INTERP: ABNORMAL
LYMPHOCYTES # BLD AUTO: 1.5 X10(3)/MCL (ref 0.6–4.6)
LYMPHOCYTES NFR BLD AUTO: 35 %
MCH RBC QN AUTO: 31.1 PG (ref 27–31)
MCHC RBC AUTO-ENTMCNC: 33.3 GM/DL (ref 33–36)
MCV RBC AUTO: 93.3 FL (ref 80–94)
MONOCYTES # BLD AUTO: 0.4 X10(3)/MCL (ref 0.1–1.3)
MONOCYTES NFR BLD AUTO: 9 %
NEUTROPHILS # BLD AUTO: 2.1 X10(3)/MCL (ref 2.1–9.2)
NEUTROPHILS NFR BLD AUTO: 48 %
PLATELET # BLD AUTO: 191 X10(3)/MCL (ref 130–400)
PMV BLD AUTO: 9.2 FL (ref 9.4–12.4)
POC IONIZED CALCIUM: 1.15 MMOL/L (ref 1.12–1.32)
POC TCO2: 23 MMOL/L (ref 24–29)
POTASSIUM BLD-SCNC: 3.8 MMOL/L (ref 3.5–4.9)
PROT SERPL-MCNC: 6.5 GM/DL (ref 6.4–8.2)
PSA SERPL-MCNC: 0.04 NG/ML (ref 0–4)
RBC # BLD AUTO: 4.34 X10(6)/MCL (ref 4.7–6.1)
SODIUM BLD-SCNC: 139 MMOL/L (ref 138–146)
WBC # SPEC AUTO: 4.3 X10(3)/MCL (ref 4.5–11.5)

## 2020-06-04 ENCOUNTER — HISTORICAL (OUTPATIENT)
Dept: HEMATOLOGY/ONCOLOGY | Facility: CLINIC | Age: 60
End: 2020-06-04

## 2020-06-04 LAB
ABS NEUT (OLG): 2.38 X10(3)/MCL (ref 2.1–9.2)
ALBUMIN SERPL-MCNC: 4.1 GM/DL (ref 3.5–5)
ALBUMIN/GLOB SERPL: 1.5 RATIO (ref 1.1–2)
ALP SERPL-CCNC: 34 UNIT/L (ref 40–150)
ALT SERPL-CCNC: 27 UNIT/L (ref 0–55)
AST SERPL-CCNC: 23 UNIT/L (ref 5–34)
BASOPHILS # BLD AUTO: 0 X10(3)/MCL (ref 0–0.2)
BASOPHILS NFR BLD AUTO: 0.4 %
BILIRUB SERPL-MCNC: 0.5 MG/DL
BILIRUBIN DIRECT+TOT PNL SERPL-MCNC: 0.2 MG/DL (ref 0–0.5)
BILIRUBIN DIRECT+TOT PNL SERPL-MCNC: 0.3 MG/DL (ref 0–0.8)
BUN SERPL-MCNC: 25 MG/DL (ref 8.4–25.7)
CALCIUM SERPL-MCNC: 9.6 MG/DL (ref 8.4–10.2)
CHLORIDE SERPL-SCNC: 106 MMOL/L (ref 98–107)
CO2 SERPL-SCNC: 25 MMOL/L (ref 22–29)
CREAT SERPL-MCNC: 1.03 MG/DL (ref 0.73–1.18)
EOSINOPHIL # BLD AUTO: 0.3 X10(3)/MCL (ref 0–0.9)
EOSINOPHIL NFR BLD AUTO: 6.1 %
ERYTHROCYTE [DISTWIDTH] IN BLOOD BY AUTOMATED COUNT: 13 % (ref 11.5–17)
GLOBULIN SER-MCNC: 2.7 GM/DL (ref 2.4–3.5)
GLUCOSE SERPL-MCNC: 127 MG/DL (ref 74–100)
HCT VFR BLD AUTO: 41.8 % (ref 42–52)
HGB BLD-MCNC: 14.1 GM/DL (ref 14–18)
LYMPHOCYTES # BLD AUTO: 2.2 X10(3)/MCL (ref 0.6–4.6)
LYMPHOCYTES NFR BLD AUTO: 40.8 %
MCH RBC QN AUTO: 32.2 PG (ref 27–31)
MCHC RBC AUTO-ENTMCNC: 33.7 GM/DL (ref 33–36)
MCV RBC AUTO: 95.4 FL (ref 80–94)
MONOCYTES # BLD AUTO: 0.5 X10(3)/MCL (ref 0.1–1.3)
MONOCYTES NFR BLD AUTO: 8.7 %
NEUTROPHILS # BLD AUTO: 2.4 X10(3)/MCL (ref 2.1–9.2)
NEUTROPHILS NFR BLD AUTO: 43.8 %
PLATELET # BLD AUTO: 226 X10(3)/MCL (ref 130–400)
PMV BLD AUTO: 9.7 FL (ref 9.4–12.4)
POTASSIUM SERPL-SCNC: 3.9 MMOL/L (ref 3.5–5.1)
PROT SERPL-MCNC: 6.8 GM/DL (ref 6.4–8.3)
PSA SERPL-MCNC: 0.03 NG/ML
RBC # BLD AUTO: 4.38 X10(6)/MCL (ref 4.7–6.1)
SODIUM SERPL-SCNC: 139 MMOL/L (ref 136–145)
WBC # SPEC AUTO: 5.4 X10(3)/MCL (ref 4.5–11.5)

## 2020-06-30 ENCOUNTER — HISTORICAL (OUTPATIENT)
Dept: INFUSION THERAPY | Facility: HOSPITAL | Age: 60
End: 2020-06-30

## 2020-09-15 ENCOUNTER — HISTORICAL (OUTPATIENT)
Dept: ADMINISTRATIVE | Facility: HOSPITAL | Age: 60
End: 2020-09-15

## 2020-09-15 LAB
ABS NEUT (OLG): 1.76 X10(3)/MCL (ref 2.1–9.2)
ALBUMIN SERPL-MCNC: 3.9 GM/DL (ref 3.5–5)
ALBUMIN/GLOB SERPL: 1.6 RATIO (ref 1.1–2)
ALP SERPL-CCNC: 37 UNIT/L (ref 40–150)
ALT SERPL-CCNC: 28 UNIT/L (ref 0–55)
AST SERPL-CCNC: 20 UNIT/L (ref 5–34)
BASOPHILS # BLD AUTO: 0 X10(3)/MCL (ref 0–0.2)
BASOPHILS NFR BLD AUTO: 1 %
BILIRUB SERPL-MCNC: 0.5 MG/DL
BILIRUBIN DIRECT+TOT PNL SERPL-MCNC: 0.2 MG/DL (ref 0–0.5)
BILIRUBIN DIRECT+TOT PNL SERPL-MCNC: 0.3 MG/DL (ref 0–0.8)
BUN SERPL-MCNC: 12.9 MG/DL (ref 8.4–25.7)
CALCIUM SERPL-MCNC: 9.5 MG/DL (ref 8.4–10.2)
CHLORIDE SERPL-SCNC: 107 MMOL/L (ref 98–107)
CO2 SERPL-SCNC: 25 MMOL/L (ref 22–29)
CREAT SERPL-MCNC: 0.97 MG/DL (ref 0.73–1.18)
EOSINOPHIL # BLD AUTO: 0.4 X10(3)/MCL (ref 0–0.9)
EOSINOPHIL NFR BLD AUTO: 9.8 %
ERYTHROCYTE [DISTWIDTH] IN BLOOD BY AUTOMATED COUNT: 13.1 % (ref 11.5–17)
GLOBULIN SER-MCNC: 2.5 GM/DL (ref 2.4–3.5)
GLUCOSE SERPL-MCNC: 93 MG/DL (ref 74–100)
HCT VFR BLD AUTO: 39.1 % (ref 42–52)
HGB BLD-MCNC: 13.5 GM/DL (ref 14–18)
LYMPHOCYTES # BLD AUTO: 1.6 X10(3)/MCL (ref 0.6–4.6)
LYMPHOCYTES NFR BLD AUTO: 37.1 %
MCH RBC QN AUTO: 32.7 PG (ref 27–31)
MCHC RBC AUTO-ENTMCNC: 34.5 GM/DL (ref 33–36)
MCV RBC AUTO: 94.7 FL (ref 80–94)
MONOCYTES # BLD AUTO: 0.4 X10(3)/MCL (ref 0.1–1.3)
MONOCYTES NFR BLD AUTO: 9.8 %
NEUTROPHILS # BLD AUTO: 1.8 X10(3)/MCL (ref 2.1–9.2)
NEUTROPHILS NFR BLD AUTO: 41.8 %
PLATELET # BLD AUTO: 200 X10(3)/MCL (ref 130–400)
PMV BLD AUTO: 9.3 FL (ref 9.4–12.4)
POTASSIUM SERPL-SCNC: 3.9 MMOL/L (ref 3.5–5.1)
PROT SERPL-MCNC: 6.4 GM/DL (ref 6.4–8.3)
PSA SERPL-MCNC: 0.02 NG/ML
RBC # BLD AUTO: 4.13 X10(6)/MCL (ref 4.7–6.1)
SODIUM SERPL-SCNC: 141 MMOL/L (ref 136–145)
WBC # SPEC AUTO: 4.2 X10(3)/MCL (ref 4.5–11.5)

## 2020-09-22 ENCOUNTER — HISTORICAL (OUTPATIENT)
Dept: INFUSION THERAPY | Facility: HOSPITAL | Age: 60
End: 2020-09-22

## 2020-10-22 ENCOUNTER — HISTORICAL (OUTPATIENT)
Dept: ADMINISTRATIVE | Facility: HOSPITAL | Age: 60
End: 2020-10-22

## 2020-10-22 LAB
CHOLEST SERPL-MCNC: 142 MG/DL
CHOLEST/HDLC SERPL: 4 {RATIO} (ref 0–5)
HDLC SERPL-MCNC: 40 MG/DL (ref 35–60)
LDLC SERPL CALC-MCNC: 64 MG/DL (ref 50–140)
T4 FREE SERPL-MCNC: 0.83 NG/DL (ref 0.7–1.48)
TRIGL SERPL-MCNC: 191 MG/DL (ref 34–140)
TSH SERPL-ACNC: 7.85 UIU/ML (ref 0.35–4.94)
VLDLC SERPL CALC-MCNC: 38 MG/DL

## 2020-12-09 ENCOUNTER — HISTORICAL (OUTPATIENT)
Dept: HEMATOLOGY/ONCOLOGY | Facility: CLINIC | Age: 60
End: 2020-12-09

## 2020-12-09 LAB
ABS NEUT (OLG): 2.09 X10(3)/MCL (ref 2.1–9.2)
ALBUMIN SERPL-MCNC: 4.3 GM/DL (ref 3.5–5)
ALBUMIN/GLOB SERPL: 1.7 RATIO (ref 1.1–2)
ALP SERPL-CCNC: 39 UNIT/L (ref 40–150)
ALT SERPL-CCNC: 25 UNIT/L (ref 0–55)
AST SERPL-CCNC: 23 UNIT/L (ref 5–34)
BASOPHILS # BLD AUTO: 0 X10(3)/MCL (ref 0–0.2)
BASOPHILS NFR BLD AUTO: 0.7 %
BILIRUB SERPL-MCNC: 0.5 MG/DL
BILIRUBIN DIRECT+TOT PNL SERPL-MCNC: 0.2 MG/DL (ref 0–0.5)
BILIRUBIN DIRECT+TOT PNL SERPL-MCNC: 0.3 MG/DL (ref 0–0.8)
BUN SERPL-MCNC: 16.6 MG/DL (ref 8.4–25.7)
CALCIUM SERPL-MCNC: 9.2 MG/DL (ref 8.4–10.2)
CHLORIDE SERPL-SCNC: 106 MMOL/L (ref 98–107)
CO2 SERPL-SCNC: 24 MMOL/L (ref 22–29)
CREAT SERPL-MCNC: 0.95 MG/DL (ref 0.73–1.18)
EOSINOPHIL # BLD AUTO: 0.3 X10(3)/MCL (ref 0–0.9)
EOSINOPHIL NFR BLD AUTO: 6.7 %
ERYTHROCYTE [DISTWIDTH] IN BLOOD BY AUTOMATED COUNT: 12.4 % (ref 11.5–17)
GLOBULIN SER-MCNC: 2.6 GM/DL (ref 2.4–3.5)
GLUCOSE SERPL-MCNC: 99 MG/DL (ref 74–100)
HCT VFR BLD AUTO: 39.9 % (ref 42–52)
HGB BLD-MCNC: 13.7 GM/DL (ref 14–18)
LYMPHOCYTES # BLD AUTO: 1.3 X10(3)/MCL (ref 0.6–4.6)
LYMPHOCYTES NFR BLD AUTO: 31.7 %
MCH RBC QN AUTO: 32.8 PG (ref 27–31)
MCHC RBC AUTO-ENTMCNC: 34.3 GM/DL (ref 33–36)
MCV RBC AUTO: 95.5 FL (ref 80–94)
MONOCYTES # BLD AUTO: 0.4 X10(3)/MCL (ref 0.1–1.3)
MONOCYTES NFR BLD AUTO: 10.6 %
NEUTROPHILS # BLD AUTO: 2.1 X10(3)/MCL (ref 2.1–9.2)
NEUTROPHILS NFR BLD AUTO: 50.1 %
PLATELET # BLD AUTO: 197 X10(3)/MCL (ref 130–400)
PMV BLD AUTO: 9.3 FL (ref 9.4–12.4)
POTASSIUM SERPL-SCNC: 4.2 MMOL/L (ref 3.5–5.1)
PROT SERPL-MCNC: 6.9 GM/DL (ref 6.4–8.3)
PSA SERPL-MCNC: 0.02 NG/ML
RBC # BLD AUTO: 4.18 X10(6)/MCL (ref 4.7–6.1)
SODIUM SERPL-SCNC: 141 MMOL/L (ref 136–145)
WBC # SPEC AUTO: 4.2 X10(3)/MCL (ref 4.5–11.5)

## 2020-12-15 ENCOUNTER — HISTORICAL (OUTPATIENT)
Dept: INFUSION THERAPY | Facility: HOSPITAL | Age: 60
End: 2020-12-15

## 2021-03-09 ENCOUNTER — HISTORICAL (OUTPATIENT)
Dept: HEMATOLOGY/ONCOLOGY | Facility: CLINIC | Age: 61
End: 2021-03-09

## 2021-03-09 LAB
ABS NEUT (OLG): 2.18 X10(3)/MCL (ref 2.1–9.2)
ALBUMIN SERPL-MCNC: 3.7 GM/DL (ref 3.5–5)
ALBUMIN/GLOB SERPL: 1.5 RATIO (ref 1.1–2)
ALP SERPL-CCNC: 38 UNIT/L (ref 40–150)
ALT SERPL-CCNC: 25 UNIT/L (ref 0–55)
AST SERPL-CCNC: 22 UNIT/L (ref 5–34)
BASOPHILS # BLD AUTO: 0 X10(3)/MCL (ref 0–0.2)
BASOPHILS NFR BLD AUTO: 0.8 %
BILIRUB SERPL-MCNC: 0.2 MG/DL
BILIRUBIN DIRECT+TOT PNL SERPL-MCNC: 0.1 MG/DL (ref 0–0.5)
BILIRUBIN DIRECT+TOT PNL SERPL-MCNC: 0.1 MG/DL (ref 0–0.8)
BUN SERPL-MCNC: 17.4 MG/DL (ref 8.4–25.7)
CALCIUM SERPL-MCNC: 8.8 MG/DL (ref 8.4–10.2)
CHLORIDE SERPL-SCNC: 112 MMOL/L (ref 98–107)
CO2 SERPL-SCNC: 22 MMOL/L (ref 22–29)
CREAT SERPL-MCNC: 0.82 MG/DL (ref 0.73–1.18)
EOSINOPHIL # BLD AUTO: 0.4 X10(3)/MCL (ref 0–0.9)
EOSINOPHIL NFR BLD AUTO: 7.4 %
ERYTHROCYTE [DISTWIDTH] IN BLOOD BY AUTOMATED COUNT: 13 % (ref 11.5–17)
GLOBULIN SER-MCNC: 2.4 GM/DL (ref 2.4–3.5)
GLUCOSE SERPL-MCNC: 103 MG/DL (ref 74–100)
HCT VFR BLD AUTO: 38.3 % (ref 42–52)
HGB BLD-MCNC: 12.8 GM/DL (ref 14–18)
LYMPHOCYTES # BLD AUTO: 1.8 X10(3)/MCL (ref 0.6–4.6)
LYMPHOCYTES NFR BLD AUTO: 36.9 %
MCH RBC QN AUTO: 32.2 PG (ref 27–31)
MCHC RBC AUTO-ENTMCNC: 33.4 GM/DL (ref 33–36)
MCV RBC AUTO: 96.5 FL (ref 80–94)
MONOCYTES # BLD AUTO: 0.5 X10(3)/MCL (ref 0.1–1.3)
MONOCYTES NFR BLD AUTO: 10.6 %
NEUTROPHILS # BLD AUTO: 2.2 X10(3)/MCL (ref 2.1–9.2)
NEUTROPHILS NFR BLD AUTO: 43.5 %
PLATELET # BLD AUTO: 171 X10(3)/MCL (ref 130–400)
PMV BLD AUTO: 8.9 FL (ref 9.4–12.4)
POTASSIUM SERPL-SCNC: 4.1 MMOL/L (ref 3.5–5.1)
PROT SERPL-MCNC: 6.1 GM/DL (ref 6.4–8.3)
PSA SERPL-MCNC: <0.02 NG/ML
RBC # BLD AUTO: 3.97 X10(6)/MCL (ref 4.7–6.1)
SODIUM SERPL-SCNC: 144 MMOL/L (ref 136–145)
WBC # SPEC AUTO: 5 X10(3)/MCL (ref 4.5–11.5)

## 2021-03-30 ENCOUNTER — HISTORICAL (OUTPATIENT)
Dept: INFUSION THERAPY | Facility: HOSPITAL | Age: 61
End: 2021-03-30

## 2021-03-30 LAB
ANION GAP SERPL CALC-SCNC: 17 MMOL/L
BUN SERPL-MCNC: 19 MG/DL (ref 8–26)
CHLORIDE SERPL-SCNC: 102 MMOL/L (ref 98–109)
CREAT SERPL-MCNC: 1 MG/DL (ref 0.6–1.3)
GLUCOSE SERPL-MCNC: 102 MG/DL (ref 70–105)
HCT VFR BLD CALC: 41 % (ref 38–51)
HGB BLD-MCNC: 13.9 MG/DL (ref 12–17)
POC IONIZED CALCIUM: 1.3 MMOL/L (ref 1.12–1.32)
POC TCO2: 25 MMOL/L (ref 24–29)
POTASSIUM BLD-SCNC: 3.9 MMOL/L (ref 3.5–4.9)
SODIUM BLD-SCNC: 140 MMOL/L (ref 138–146)

## 2021-06-09 ENCOUNTER — HISTORICAL (OUTPATIENT)
Dept: HEMATOLOGY/ONCOLOGY | Facility: CLINIC | Age: 61
End: 2021-06-09

## 2021-06-09 LAB
ABS NEUT (OLG): 2.2 X10(3)/MCL (ref 2.1–9.2)
ALBUMIN SERPL-MCNC: 3.6 GM/DL (ref 3.5–5)
ALBUMIN/GLOB SERPL: 1.3 RATIO (ref 1.1–2)
ALP SERPL-CCNC: 43 UNIT/L (ref 40–150)
ALT SERPL-CCNC: 22 UNIT/L (ref 0–55)
AST SERPL-CCNC: 20 UNIT/L (ref 5–34)
BASOPHILS # BLD AUTO: 0 X10(3)/MCL (ref 0–0.2)
BASOPHILS NFR BLD AUTO: 0.6 %
BILIRUB SERPL-MCNC: 0.4 MG/DL
BILIRUBIN DIRECT+TOT PNL SERPL-MCNC: 0.2 MG/DL (ref 0–0.5)
BILIRUBIN DIRECT+TOT PNL SERPL-MCNC: 0.2 MG/DL (ref 0–0.8)
BUN SERPL-MCNC: 23.4 MG/DL (ref 8.4–25.7)
CALCIUM SERPL-MCNC: 9.5 MG/DL (ref 8.4–10.2)
CHLORIDE SERPL-SCNC: 103 MMOL/L (ref 98–107)
CO2 SERPL-SCNC: 24 MMOL/L (ref 22–29)
CREAT SERPL-MCNC: 1.08 MG/DL (ref 0.73–1.18)
EOSINOPHIL # BLD AUTO: 0.4 X10(3)/MCL (ref 0–0.9)
EOSINOPHIL NFR BLD AUTO: 8.3 %
ERYTHROCYTE [DISTWIDTH] IN BLOOD BY AUTOMATED COUNT: 12.9 % (ref 11.5–17)
GLOBULIN SER-MCNC: 2.7 GM/DL (ref 2.4–3.5)
GLUCOSE SERPL-MCNC: 130 MG/DL (ref 74–100)
HCT VFR BLD AUTO: 38.7 % (ref 42–52)
HGB BLD-MCNC: 13.2 GM/DL (ref 14–18)
LYMPHOCYTES # BLD AUTO: 2.2 X10(3)/MCL (ref 0.6–4.6)
LYMPHOCYTES NFR BLD AUTO: 40.5 %
MCH RBC QN AUTO: 32 PG (ref 27–31)
MCHC RBC AUTO-ENTMCNC: 34.1 GM/DL (ref 33–36)
MCV RBC AUTO: 93.9 FL (ref 80–94)
MONOCYTES # BLD AUTO: 0.5 X10(3)/MCL (ref 0.1–1.3)
MONOCYTES NFR BLD AUTO: 9.8 %
NEUTROPHILS # BLD AUTO: 2.2 X10(3)/MCL (ref 2.1–9.2)
NEUTROPHILS NFR BLD AUTO: 40.6 %
PLATELET # BLD AUTO: 200 X10(3)/MCL (ref 130–400)
PMV BLD AUTO: 9.1 FL (ref 9.4–12.4)
POTASSIUM SERPL-SCNC: 3.9 MMOL/L (ref 3.5–5.1)
PROT SERPL-MCNC: 6.3 GM/DL (ref 6.4–8.3)
PSA SERPL-MCNC: <0.1 NG/ML
RBC # BLD AUTO: 4.12 X10(6)/MCL (ref 4.7–6.1)
SODIUM SERPL-SCNC: 136 MMOL/L (ref 136–145)
WBC # SPEC AUTO: 5.4 X10(3)/MCL (ref 4.5–11.5)

## 2021-07-09 ENCOUNTER — HISTORICAL (OUTPATIENT)
Dept: HEMATOLOGY/ONCOLOGY | Facility: CLINIC | Age: 61
End: 2021-07-09

## 2021-07-09 LAB
ABS NEUT (OLG): 2.79 X10(3)/MCL (ref 2.1–9.2)
ALBUMIN SERPL-MCNC: 3.8 GM/DL (ref 3.5–5)
ALBUMIN/GLOB SERPL: 1.3 RATIO (ref 1.1–2)
ALP SERPL-CCNC: 40 UNIT/L (ref 40–150)
ALT SERPL-CCNC: 33 UNIT/L (ref 0–55)
AST SERPL-CCNC: 29 UNIT/L (ref 5–34)
BASOPHILS # BLD AUTO: 0 X10(3)/MCL (ref 0–0.2)
BASOPHILS NFR BLD AUTO: 0.3 %
BILIRUB SERPL-MCNC: 0.5 MG/DL
BILIRUBIN DIRECT+TOT PNL SERPL-MCNC: 0.2 MG/DL (ref 0–0.5)
BILIRUBIN DIRECT+TOT PNL SERPL-MCNC: 0.3 MG/DL (ref 0–0.8)
BUN SERPL-MCNC: 20.8 MG/DL (ref 8.4–25.7)
CALCIUM SERPL-MCNC: 9.4 MG/DL (ref 8.4–10.2)
CHLORIDE SERPL-SCNC: 107 MMOL/L (ref 98–107)
CO2 SERPL-SCNC: 22 MMOL/L (ref 22–29)
CREAT SERPL-MCNC: 0.91 MG/DL (ref 0.73–1.18)
EOSINOPHIL # BLD AUTO: 0.3 X10(3)/MCL (ref 0–0.9)
EOSINOPHIL NFR BLD AUTO: 5.4 %
ERYTHROCYTE [DISTWIDTH] IN BLOOD BY AUTOMATED COUNT: 12.9 % (ref 11.5–17)
GLOBULIN SER-MCNC: 2.9 GM/DL (ref 2.4–3.5)
GLUCOSE SERPL-MCNC: 105 MG/DL (ref 74–100)
HCT VFR BLD AUTO: 37.9 % (ref 42–52)
HGB BLD-MCNC: 13.1 GM/DL (ref 14–18)
LYMPHOCYTES # BLD AUTO: 2.2 X10(3)/MCL (ref 0.6–4.6)
LYMPHOCYTES NFR BLD AUTO: 38.1 %
MCH RBC QN AUTO: 31.6 PG (ref 27–31)
MCHC RBC AUTO-ENTMCNC: 34.6 GM/DL (ref 33–36)
MCV RBC AUTO: 91.5 FL (ref 80–94)
MONOCYTES # BLD AUTO: 0.4 X10(3)/MCL (ref 0.1–1.3)
MONOCYTES NFR BLD AUTO: 7.4 %
NEUTROPHILS # BLD AUTO: 2.8 X10(3)/MCL (ref 2.1–9.2)
NEUTROPHILS NFR BLD AUTO: 48.3 %
PLATELET # BLD AUTO: 184 X10(3)/MCL (ref 130–400)
PMV BLD AUTO: 8.6 FL (ref 9.4–12.4)
POTASSIUM SERPL-SCNC: 4.3 MMOL/L (ref 3.5–5.1)
PROT SERPL-MCNC: 6.7 GM/DL (ref 6.4–8.3)
PSA SERPL-MCNC: <0.1 NG/ML
RBC # BLD AUTO: 4.14 X10(6)/MCL (ref 4.7–6.1)
SODIUM SERPL-SCNC: 140 MMOL/L (ref 136–145)
WBC # SPEC AUTO: 5.8 X10(3)/MCL (ref 4.5–11.5)

## 2021-08-06 ENCOUNTER — HISTORICAL (OUTPATIENT)
Dept: HEMATOLOGY/ONCOLOGY | Facility: CLINIC | Age: 61
End: 2021-08-06

## 2021-08-06 LAB
ABS NEUT (OLG): 2.4 X10(3)/MCL (ref 2.1–9.2)
ALBUMIN SERPL-MCNC: 4 GM/DL (ref 3.5–5)
ALBUMIN/GLOB SERPL: 1.5 RATIO (ref 1.1–2)
ALP SERPL-CCNC: 55 UNIT/L (ref 40–150)
ALT SERPL-CCNC: 14 UNIT/L (ref 0–55)
AST SERPL-CCNC: 15 UNIT/L (ref 5–34)
BASOPHILS # BLD AUTO: 0 X10(3)/MCL (ref 0–0.2)
BASOPHILS NFR BLD AUTO: 0.8 %
BILIRUB SERPL-MCNC: 0.4 MG/DL
BILIRUBIN DIRECT+TOT PNL SERPL-MCNC: 0.2 MG/DL (ref 0–0.5)
BILIRUBIN DIRECT+TOT PNL SERPL-MCNC: 0.2 MG/DL (ref 0–0.8)
BUN SERPL-MCNC: 19.1 MG/DL (ref 8.4–25.7)
CALCIUM SERPL-MCNC: 9.8 MG/DL (ref 8.4–10.2)
CHLORIDE SERPL-SCNC: 106 MMOL/L (ref 98–107)
CO2 SERPL-SCNC: 23 MMOL/L (ref 22–29)
CREAT SERPL-MCNC: 1.15 MG/DL (ref 0.73–1.18)
EOSINOPHIL # BLD AUTO: 0.4 X10(3)/MCL (ref 0–0.9)
EOSINOPHIL NFR BLD AUTO: 7.3 %
ERYTHROCYTE [DISTWIDTH] IN BLOOD BY AUTOMATED COUNT: 13.7 % (ref 11.5–17)
GLOBULIN SER-MCNC: 2.7 GM/DL (ref 2.4–3.5)
GLUCOSE SERPL-MCNC: 102 MG/DL (ref 74–100)
HCT VFR BLD AUTO: 36.1 % (ref 42–52)
HGB BLD-MCNC: 11.8 GM/DL (ref 14–18)
LYMPHOCYTES # BLD AUTO: 1.8 X10(3)/MCL (ref 0.6–4.6)
LYMPHOCYTES NFR BLD AUTO: 34.7 %
MCH RBC QN AUTO: 31.6 PG (ref 27–31)
MCHC RBC AUTO-ENTMCNC: 32.7 GM/DL (ref 33–36)
MCV RBC AUTO: 96.8 FL (ref 80–94)
MONOCYTES # BLD AUTO: 0.5 X10(3)/MCL (ref 0.1–1.3)
MONOCYTES NFR BLD AUTO: 9.3 %
NEUTROPHILS # BLD AUTO: 2.4 X10(3)/MCL (ref 2.1–9.2)
NEUTROPHILS NFR BLD AUTO: 47.7 %
PLATELET # BLD AUTO: 249 X10(3)/MCL (ref 130–400)
PMV BLD AUTO: 9 FL (ref 9.4–12.4)
POTASSIUM SERPL-SCNC: 4.3 MMOL/L (ref 3.5–5.1)
PROT SERPL-MCNC: 6.7 GM/DL (ref 6.4–8.3)
PSA SERPL-MCNC: <0.1 NG/ML
RBC # BLD AUTO: 3.73 X10(6)/MCL (ref 4.7–6.1)
SODIUM SERPL-SCNC: 139 MMOL/L (ref 136–145)
WBC # SPEC AUTO: 5 X10(3)/MCL (ref 4.5–11.5)

## 2021-11-05 ENCOUNTER — HISTORICAL (OUTPATIENT)
Dept: HEMATOLOGY/ONCOLOGY | Facility: CLINIC | Age: 61
End: 2021-11-05

## 2021-11-05 LAB
ABS NEUT (OLG): 2.15 X10(3)/MCL (ref 2.1–9.2)
ALBUMIN SERPL-MCNC: 3.9 GM/DL (ref 3.4–4.8)
ALBUMIN/GLOB SERPL: 1.4 RATIO (ref 1.1–2)
ALP SERPL-CCNC: 56 UNIT/L (ref 40–150)
ALT SERPL-CCNC: 21 UNIT/L (ref 0–55)
AST SERPL-CCNC: 22 UNIT/L (ref 5–34)
BASOPHILS # BLD AUTO: 0 X10(3)/MCL (ref 0–0.2)
BASOPHILS NFR BLD AUTO: 1 %
BILIRUB SERPL-MCNC: 0.5 MG/DL
BILIRUBIN DIRECT+TOT PNL SERPL-MCNC: 0.2 MG/DL (ref 0–0.5)
BILIRUBIN DIRECT+TOT PNL SERPL-MCNC: 0.3 MG/DL (ref 0–0.8)
BUN SERPL-MCNC: 16.1 MG/DL (ref 8.4–25.7)
CALCIUM SERPL-MCNC: 10 MG/DL (ref 8.7–10.5)
CHLORIDE SERPL-SCNC: 106 MMOL/L (ref 98–107)
CO2 SERPL-SCNC: 22 MMOL/L (ref 23–31)
CREAT SERPL-MCNC: 0.99 MG/DL (ref 0.73–1.18)
EOSINOPHIL # BLD AUTO: 0.3 X10(3)/MCL (ref 0–0.9)
EOSINOPHIL NFR BLD AUTO: 6.7 %
ERYTHROCYTE [DISTWIDTH] IN BLOOD BY AUTOMATED COUNT: 13.9 % (ref 11.5–17)
GLOBULIN SER-MCNC: 2.8 GM/DL (ref 2.4–3.5)
GLUCOSE SERPL-MCNC: 88 MG/DL (ref 82–115)
HCT VFR BLD AUTO: 40.4 % (ref 42–52)
HGB BLD-MCNC: 13.4 GM/DL (ref 14–18)
LYMPHOCYTES # BLD AUTO: 2 X10(3)/MCL (ref 0.6–4.6)
LYMPHOCYTES NFR BLD AUTO: 40 %
MCH RBC QN AUTO: 30.4 PG (ref 27–31)
MCHC RBC AUTO-ENTMCNC: 33.2 GM/DL (ref 33–36)
MCV RBC AUTO: 91.6 FL (ref 80–94)
MONOCYTES # BLD AUTO: 0.5 X10(3)/MCL (ref 0.1–1.3)
MONOCYTES NFR BLD AUTO: 9.6 %
NEUTROPHILS # BLD AUTO: 2.2 X10(3)/MCL (ref 2.1–9.2)
NEUTROPHILS NFR BLD AUTO: 42.3 %
PLATELET # BLD AUTO: 197 X10(3)/MCL (ref 130–400)
PMV BLD AUTO: 9.3 FL (ref 9.4–12.4)
POTASSIUM SERPL-SCNC: 4.3 MMOL/L (ref 3.5–5.1)
PROT SERPL-MCNC: 6.7 GM/DL (ref 5.8–7.6)
PSA SERPL-MCNC: <0.1 NG/ML
RBC # BLD AUTO: 4.41 X10(6)/MCL (ref 4.7–6.1)
SODIUM SERPL-SCNC: 141 MMOL/L (ref 136–145)
WBC # SPEC AUTO: 5.1 X10(3)/MCL (ref 4.5–11.5)

## 2022-02-04 ENCOUNTER — HISTORICAL (OUTPATIENT)
Dept: HEMATOLOGY/ONCOLOGY | Facility: CLINIC | Age: 62
End: 2022-02-04

## 2022-02-04 LAB
ABS NEUT (OLG): 2.57 (ref 2.1–9.2)
ALBUMIN SERPL-MCNC: 3.8 G/DL (ref 3.4–4.8)
ALBUMIN/GLOB SERPL: 1.4 {RATIO} (ref 1.1–2)
ALP SERPL-CCNC: 57 U/L (ref 40–150)
ALT SERPL-CCNC: 22 U/L (ref 0–55)
AST SERPL-CCNC: 22 U/L (ref 5–34)
BASOPHILS # BLD AUTO: 0 10*3/UL (ref 0–0.2)
BASOPHILS NFR BLD AUTO: 0.8 %
BILIRUB SERPL-MCNC: 0.3 MG/DL
BILIRUBIN DIRECT+TOT PNL SERPL-MCNC: 0.1 (ref 0–0.5)
BILIRUBIN DIRECT+TOT PNL SERPL-MCNC: 0.2 (ref 0–0.8)
BUN SERPL-MCNC: 15.9 MG/DL (ref 8.4–25.7)
CALCIUM SERPL-MCNC: 10.3 MG/DL (ref 8.7–10.5)
CHLORIDE SERPL-SCNC: 106 MMOL/L (ref 98–107)
CO2 SERPL-SCNC: 26 MMOL/L (ref 23–31)
CREAT SERPL-MCNC: 1.13 MG/DL (ref 0.73–1.18)
EOSINOPHIL # BLD AUTO: 0.3 10*3/UL (ref 0–0.9)
EOSINOPHIL NFR BLD AUTO: 6.4 %
ERYTHROCYTE [DISTWIDTH] IN BLOOD BY AUTOMATED COUNT: 13.1 % (ref 11.5–17)
GLOBULIN SER-MCNC: 2.7 G/DL (ref 2.4–3.5)
GLUCOSE SERPL-MCNC: 89 MG/DL (ref 82–115)
HCT VFR BLD AUTO: 39.7 % (ref 42–52)
HEMOLYSIS INTERF INDEX SERPL-ACNC: 3
HGB BLD-MCNC: 13.3 G/DL (ref 14–18)
ICTERIC INTERF INDEX SERPL-ACNC: 0
LIPEMIC INTERF INDEX SERPL-ACNC: 1
LYMPHOCYTES # BLD AUTO: 1.7 10*3/UL (ref 0.6–4.6)
LYMPHOCYTES NFR BLD AUTO: 33.7 %
MANUAL DIFF? (OHS): NO
MCH RBC QN AUTO: 31.8 PG (ref 27–31)
MCHC RBC AUTO-ENTMCNC: 33.5 G/DL (ref 33–36)
MCV RBC AUTO: 95 FL (ref 80–94)
MONOCYTES # BLD AUTO: 0.5 10*3/UL (ref 0.1–1.3)
MONOCYTES NFR BLD AUTO: 8.9 %
NEUTROPHILS # BLD AUTO: 2.6 10*3/UL (ref 2.1–9.2)
NEUTROPHILS NFR BLD AUTO: 50 %
PLATELET # BLD AUTO: 193 10*3/UL (ref 130–400)
PMV BLD AUTO: 9.5 FL (ref 9.4–12.4)
POTASSIUM SERPL-SCNC: 4.2 MMOL/L (ref 3.5–5.1)
PROT SERPL-MCNC: 6.5 G/DL (ref 5.8–7.6)
PSA SERPL-MCNC: <0.1 NG/ML
RBC # BLD AUTO: 4.18 10*6/UL (ref 4.7–6.1)
SODIUM SERPL-SCNC: 143 MMOL/L (ref 136–145)
WBC # SPEC AUTO: 5.1 10*3/UL (ref 4.5–11.5)

## 2022-04-07 ENCOUNTER — HISTORICAL (OUTPATIENT)
Dept: ADMINISTRATIVE | Facility: HOSPITAL | Age: 62
End: 2022-04-07
Payer: MEDICAID

## 2022-04-24 VITALS
DIASTOLIC BLOOD PRESSURE: 86 MMHG | HEIGHT: 72 IN | OXYGEN SATURATION: 97 % | SYSTOLIC BLOOD PRESSURE: 138 MMHG | WEIGHT: 244 LBS | BODY MASS INDEX: 33.05 KG/M2

## 2022-04-30 NOTE — PROGRESS NOTES
Oncology Office Visit      Patient:   Manjinder Dong            MRN: [id number]            FIN: [id number]               Age:   60 years     Sex:  Male     :  1960   Associated Diagnoses:   None   Author:   Veronica LIMON, Mari aT Kitchen        Referring Physician: Dr Adams  PCP: None      Visit Information     Problem List:  1. Stage IV Castrate  resistant Metastatic Prostate cancer --> as of 2018 with rising PSA and subsequent scan showing possible bone metastatic progression in early 2018.  2. Bailey 7 hormone sensitive metastatic prostate cancer with bone metastases diagnosed in May, 2015.  Previously followed by Dr. Heaton  3.  Bone pain secondary to #1.  well controlled.   4.  Hot flashes-- stable   5.  Chronic joint pain secondary to effects of Lupron and Xtandi        Current Treatment:  1.  Xtandi 160 mg po daily (written for 18)  start date 2018  2.  Lupron every 6 months with Dr. Adams-- Next due   3.  Xgeva 120 mg subcu monthly.  Changed to every 3 months  2017.    4. Plan for short course of XRT per Rad/Onc based on STAMPEDE study, plan for 20 fractions---- tentative start date 2020       Treatment History:  1.  Taxotere ×6 cycles completed in .  Exact dates uncertain based on available records from [facility]  2.  Lupron q 6 months started May 2015.    3.  Casodex stopped 2017  4. Xofigo Q4W x 6 treatments  +  palliative radiation to back/hips-- completed #6 of 6   18      HPI/Clinical History:  Mr. Dong is a very pleasant 60-year-old gentleman kindly referred by Dr. Adams for second opinion for known history of metastatic prostate cancer.  The patient's history is remarkable for rising PSA back in  initially discovered to be 27 in 2015, and subsequently rising to 44 by May 2015 which prompted the biopsy.  Biopsy revealed Bailey 7 T4 being prostate cancer.  Scans done at that time included bone scan and CTs which  revealed bone lesions throughout the thoracic and lumbar vertebral bodies along with the ribs, additional lesions in the pelvis, as well as enlarged left internal and external iliac metastatic nodes.  The patient reportedly underwent radiation to the prostate although I do not have records to definitively confirm this.  He was started on Lupron as well as given 6 cycles of Taxotere with Dr. Heaton which was completed in 2016.  We have requested records but have somewhat limited information regarding the details of treatment.  In any case, he seemed to tolerate treatment well and had significant decline in his PSA going down to 0.11 as of December 2015 and reportedly staying close to that value in the ensuing several months.  He receives every 6 month Lupron under the care of Dr. Adams and is also on Casodex.  He also received monthly Xgeva under the care of Dr. Heaton.  However, for various reasons he requested to have his oncologic care changed and was therefore referred to us.  His last set of scans were MRI of the brain in April 2016 which showed no significant pathology.  Otherwise prior CT and bone scans were done in November 2015  PSA done July 13 2017 = 0.13.  PSA done October 27 2017 = 0.22.  PSA 1/25/18 = 0.38  PSA 3/1/18 = 0.52 .   Restaging CT and bone scans performed March 1, 2018 shows several sclerotic lesions in the thoracic spine, left scapula, right ribs. Most of the lesions that can be compared to the prior CT scan of the abdomen and pelvis performed at [facility] in 2015 show relative stability. Corresponding bone scan done March 1, 2018 shows increased uptake at T7. Other areas noted on both bone scan that correspond to the CT scan, reveal sclerotic areas consistent with possible treated/healed metastases.  MRI of the spine done 3/20/18 noted sclerotic metastases to left L1 pedicle and iliac bones bilaterally. Multilevel disc disease is present- worst at L4-5 where left  paracentral and left foraminal disc extrusion is present causing suspected impingment of the left L4/L5 nerve roots. Focal bone marrow lesions in T1, T7 and L1 are consistent with metastases.   Patient then underwent T4/T5 spinal fusion surgery by Dr. Turner April 26, 2018, along with kyphoplasty of one vertebral body    Repeat PSA 5/4/18 noted at 1.03.   Repeat PSA  7/13/18 = 1.04  Repeat PSA 9/5/18 = 1.34.   Paitent then finished Xofigo that month  Repeat PSA done 12/10/18 = 1.84   Restaging CT and bone scans ordered by Dr. Lane on 12/4/18:   Stable osseous mets right acetabulum, b/l iliac bones, L1 vertebra, T-spine.  No soft tissue disease.   Patient was then started on Xtandi 160 mg/day during his visit on 12/11/18.      Repeat PSA on 3/6/19 at 0.41  Repeat PSA 6/5/2019 = 0.2  Repeat PSA on 9/4/19 = 0.09  Repeat PSA on 12/4/2019 was 0.06  Bilateral diagnostic MMG on 3/3/2020- showed no mammographic evidence of malignancy; bilateral gynecomastia is benign and read as BIRADS 2  PSA 3/4/2020 noted at 0.04      PMHx: Hypertension, metastatic prostate cancer  PSHx: Prostate biopsy, low back surgery in 2000; T4/T5 spinal fusion April 2018  Social Hx: Patient is  and disabled.  Former smoker pack per day ×33 years quit July 2015 drinks casually  Family Hx: Brother with head and neck cancer      Chief Complaint    Follow Up   12/15/2020 9:01 CST      No concerns today        Interval History   Patient here for scheduled 3 month follow up. He is doing well. He does have fatigue.  Denies HA, SOB, CP, N/V/C/D.  He denies any bone pain or back pain or any other major issues. His PSA is 0.02      Review of Systems   14 point review of systems done in full with pertinent positives  noted in above interval history. Remainder of ROS unremarkable.       Health Status   Allergies:    Allergic Reactions (Selected)  No Known Medication Allergies,    Allergies (1) Active Reaction  No Known Medication Allergies None  Documented     Current medications:  (Selected)   Inpatient Medications  Future  Xgeva SC Injection: 120 mg, form: Soln, Subcutaneous, Once-NOW, *Est. first dose 12/15/20 8:00:00 CST, *Est. stop date 12/15/20 8:00:00 CST, Routine, Months 4, Future Order  Prescriptions  Prescribed  Anoro Ellipta 62.5 mcg-25 mcg/inh inhalation powder: See Instructions, Inhale 1 puff by mouth once daily, # 60 EA, 11 Refill(s), Pharmacy: [facility], 182, cm, Height/Length Dosing, 03/09/20 9:15:00 CDT, 115, kg, Weight Dosing, 03/09/20 9:15:00 CDT  Xtandi 40 mg oral capsule: 160 mg = 4 cap(s), Oral, Daily, # 120 cap(s), 5 Refill(s), Pharmacy: [facility]  Xtandi 40 mg oral capsule: 160 mg = 4 cap(s), Oral, Daily, # 120 cap(s), 6 Refill(s), Pharmacy: [facility]  Xtandi 40 mg oral capsule: 160 mg = 4 cap(s), Oral, Daily, # 120 cap(s), 6 Refill(s), Pharmacy: [facility], 182, cm, Height/Length Dosing, 06/30/20 15:27:00 CDT, 110.1, kg, Weight Dosing, 06/30/20 15:27:00 CDT  Documented Medications  Documented  Aleve 220 mg oral capsule: 220 mg = 1 cap(s), Oral, BID, 0 Refill(s)  Caltrate 600 + D oral tablet: 1 tab(s), Oral, BID, 0 Refill(s)  Daily Multiple for Men 50+ oral tablet: Oral, Daily, 0 Refill(s)  Dexilant 60 mg oral delayed release capsule: 60 mg = 1 cap(s), Oral, Daily, 0 Refill(s)  Dulcolax Stool Softener 100 mg oral capsule: 100 mg = 1 cap(s), Oral, Daily, PRN PRN for constipation, 0 Refill(s)  Tylenol Caplet 500 mg oral tablet: 500 mg = 1 tab(s), take 2 in am and 2 and hs every other day, 0 Refill(s)  acetaminophen-oxycodone 300 mg-10 mg oral tablet: 1 tab(s), Oral, q4hr, PRN PRN for pain, # 60 tab(s), 0 Refill(s)  amlodipine-atorvastatin 10 mg-10 mg oral tablet: 1 tab(s), Oral, Daily, # 30 tab(s), 0 Refill(s)  aspirin 81 mg oral tablet: 81 mg = 1 tab(s), Oral, Daily, # 30 tab(s), 0 Refill(s)  furosemide 40 mg oral tablet: 40 mg = 1 tab(s), Oral, Daily  gabapentin 300 mg oral capsule: 2 in am   1 in pm, 0 Refill(s)  loratadine 10  mg oral capsule: 10 mg = 1 cap(s), Oral, Daily, 0 Refill(s)  losartan 50 mg oral tablet: 50 mg = 1 tab(s), Oral, Daily, # 30 tab(s), 0 Refill(s)  multivitamin with minerals (Adult Tab): 1 tab(s), Oral, Daily, # 30 tab(s), 0 Refill(s)  pure calcium with ipriflavone: pure calcium with ipriflavone, 3 tab QD, 0 Refill(s)  ranitidine 150 mg oral tablet: 150 mg = 1 tab(s), Oral, Daily, 0 Refill(s)  tamsulosin 0.4 mg oral capsule: See Instructions, 1 cap(s) as needed, 0 Refill(s)   Problem list:    All Problems  Displacement of lumbar intervertebral disc without myelopathy / [id number] / Confirmed  Dyspnea / 169699491 / Confirmed  Prostate cancer / 6999709416 / Confirmed  Obesity / 8149851017 / Probable  Review of care plan / [id number] / Confirmed  Bone metastasis / [id number] / Confirmed,    Active Problems (6)  Bone metastasis   Displacement of lumbar intervertebral disc without myelopathy   Dyspnea   Obesity   Prostate cancer   Review of care plan         Histories   Past Medical History:    No active or resolved past medical history items have been selected or recorded.   Family History:    No family history items have been selected or recorded.   Procedure history:    lumbar disc surgery in 2000 at 40 Years.   Social History        Social & Psychosocial Habits    Alcohol  07/13/2017  Use: Current    Frequency: 1-2 times per month    Substance Use  07/13/2017  Use: Never    Tobacco  09/22/2020  Use: Former smoker, quit more    Patient Wants Consult For Cessation Counseling N/A    Type: Cigarettes    11/04/2020  Use: Former smoker, quit more    Patient Wants Consult For Cessation Counseling No    Tobacco use per day: 20    Number of years: 35    Comment: patient quit smoking 6 years ago - 11/04/2020 13:38 - Annika Gordon    Abuse/Neglect  11/04/2020  SHX Any signs of abuse or neglect No    Feels unsafe at home: No    Safe place to go: Yes  .        Physical Examination   Vital Signs   12/15/2020 9:05 CST       Temperature Oral          36.6 DegC                             Temperature Oral (calculated)             97.88 DegF                             Peripheral Pulse Rate     76 bpm                             SpO2                      99 %                             Oxygen Therapy            Room air                             Systolic Blood Pressure   125 mmHg                             Diastolic Blood Pressure  79 mmHg                             Blood Pressure Location   Left arm                             Manual Cuff BP            No              General:  Alert and oriented, No acute distress.    Eye:  Extraocular movements are intact, Normal conjunctiva.    HENT:  Normocephalic, Oral mucosa is moist.    Neck:  Supple, No lymphadenopathy.    Respiratory:  Lungs are clear to auscultation, Respirations are non-labored, Breath sounds are equal.    Cardiovascular:  Normal rate, Regular rhythm, No edema.    Gastrointestinal:  Soft, Non-tender, Non-distended, Normal bowel sounds.    Integumentary:  Warm, Dry, Intact, No rash.    Neurologic:  Alert, Oriented, Chronic left foot drop (dates back to early 2000's). .    Psychiatric:  Cooperative, Appropriate mood & affect.    Breast:  slight tenderness to breast bilaterally s/t gynecomastia.    Genitourinary:  No costovertebral angle tenderness.    Lymphatics:  No lymphadenopathy neck, axilla, groin.    Musculoskeletal:  Normal gait.    Cognition and Speech:  Oriented, Speech clear and coherent, Functional cognition intact.    ECOG Performance Scale: 1- Strenuous physical activity restricted; fully ambulatory and able to carry out light work.      Review / Management   Results review:  Lab results   12/9/2020 8:47 CST       WBC                       4.2 x10(3)/mcL  LOW                             RBC                       4.18 x10(6)/mcL  LOW                             Hgb                       13.7 gm/dL  LOW                             Hct                       39.9 %   LOW                             Platelet                  197 x10(3)/mcL                             MCV                       95.5 fL  HI                             MCH                       32.8 pg  HI                             MCHC                      34.3 gm/dL                             RDW                       12.4 %                             MPV                       9.3 fL  LOW                             Abs Neut                  2.09 x10(3)/mcL  LOW                             NEUT%                     50.1 %  NA                             NEUT#                     2.1 x10(3)/mcL                             LYMPH%                    31.7 %  NA                             LYMPH#                    1.3 x10(3)/mcL                             MONO%                     10.6 %  NA                             MONO#                     0.4 x10(3)/mcL                             EOS%                      6.7 %  NA                             EOS#                      0.3 x10(3)/mcL                             BASO%                     0.7 %  NA                             BASO#                     0.0 x10(3)/mcL                             Sodium Lvl                141 mmol/L                             Potassium Lvl             4.2 mmol/L                             Chloride                  106 mmol/L                             CO2                       24 mmol/L                             Calcium Lvl               9.2 mg/dL                             Glucose Lvl               99 mg/dL                             BUN                       16.6 mg/dL                             Creatinine                0.95 mg/dL                             eGFR-AA                   >60  NA                             eGFR-MADHU                  >60 mL/min/1.73 m2  NA                             Bili Total                0.5 mg/dL                             Bili Direct               0.2 mg/dL                              Bili Indirect             0.30 mg/dL                             AST                       23 unit/L                             ALT                       25 unit/L                             Alk Phos                  39 unit/L  LOW                             Total Protein             6.9 gm/dL                             Albumin Lvl               4.3 gm/dL                             Globulin                  2.6 gm/dL                             A/G Ratio                 1.7 ratio                             PSA                       0.02 ng/mL  .       Impression and Plan   Diagnoses:  1. 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  2. [place] 7 hormone sensitive metastatic prostate cancer with bone metastases diagnosed in May, 2015.  Previously followed by Dr. Heaton  3.  Bone pain secondary to #1.  well controlled.   4.  Hot flashes-- stable   5.  Chronic joint pain secondary to effects of Lupron and Xtandi    Plan:  At this time, the patient will continue on his Xtandi.  We will follow-up his PSA from today.  We will plan to repeat scans for 2 consecutive rises in PSA or a rapid increase in PSA velocity.  He will continue Lupron every 6 months, March 2021  Xgeva is given every 3 months, due today  Continue to follow with rad/onc  Return to clinic in 3 months for an office visit   Plan for repeat CBC, CMP, and PSA prior to appt  He knows to call with any new/worsening symptoms

## 2022-05-04 DIAGNOSIS — C79.51 BONE METASTASIS: Primary | ICD-10-CM

## 2022-05-04 DIAGNOSIS — C61 PROSTATE CANCER: ICD-10-CM

## 2022-06-03 DIAGNOSIS — C61 PROSTATE CANCER: ICD-10-CM

## 2022-06-03 DIAGNOSIS — C79.51 BONE METASTASIS: Primary | ICD-10-CM

## 2022-06-06 ENCOUNTER — LAB VISIT (OUTPATIENT)
Dept: LAB | Facility: HOSPITAL | Age: 62
End: 2022-06-06
Payer: COMMERCIAL

## 2022-06-06 DIAGNOSIS — C61 PROSTATE CANCER: ICD-10-CM

## 2022-06-06 DIAGNOSIS — C79.51 BONE METASTASIS: ICD-10-CM

## 2022-06-06 LAB
ALBUMIN SERPL-MCNC: 4 GM/DL (ref 3.4–4.8)
ALBUMIN/GLOB SERPL: 1.5 RATIO (ref 1.1–2)
ALP SERPL-CCNC: 58 UNIT/L (ref 40–150)
ALT SERPL-CCNC: 18 UNIT/L (ref 0–55)
AST SERPL-CCNC: 21 UNIT/L (ref 5–34)
BASOPHILS # BLD AUTO: 0.03 X10(3)/MCL (ref 0–0.2)
BASOPHILS NFR BLD AUTO: 0.6 %
BILIRUBIN DIRECT+TOT PNL SERPL-MCNC: 0.5 MG/DL
BUN SERPL-MCNC: 12.7 MG/DL (ref 8.4–25.7)
CALCIUM SERPL-MCNC: 10.2 MG/DL (ref 8.8–10)
CHLORIDE SERPL-SCNC: 106 MMOL/L (ref 98–107)
CO2 SERPL-SCNC: 25 MMOL/L (ref 23–31)
CREAT SERPL-MCNC: 1.14 MG/DL (ref 0.73–1.18)
EOSINOPHIL # BLD AUTO: 0.43 X10(3)/MCL (ref 0–0.9)
EOSINOPHIL NFR BLD AUTO: 8 %
ERYTHROCYTE [DISTWIDTH] IN BLOOD BY AUTOMATED COUNT: 13 % (ref 11.5–17)
FREE/TOTAL PSA (OHS): NORMAL
GLOBULIN SER-MCNC: 2.6 GM/DL (ref 2.4–3.5)
GLUCOSE SERPL-MCNC: 101 MG/DL (ref 82–115)
HCT VFR BLD AUTO: 40.7 % (ref 42–52)
HGB BLD-MCNC: 13.7 GM/DL (ref 14–18)
IMM GRANULOCYTES # BLD AUTO: 0.01 X10(3)/MCL (ref 0–0.02)
IMM GRANULOCYTES NFR BLD AUTO: 0.2 % (ref 0–0.43)
LYMPHOCYTES # BLD AUTO: 2.41 X10(3)/MCL (ref 0.6–4.6)
LYMPHOCYTES NFR BLD AUTO: 45 %
MCH RBC QN AUTO: 31.5 PG (ref 27–31)
MCHC RBC AUTO-ENTMCNC: 33.7 MG/DL (ref 33–36)
MCV RBC AUTO: 93.6 FL (ref 80–94)
MONOCYTES # BLD AUTO: 0.43 X10(3)/MCL (ref 0.1–1.3)
MONOCYTES NFR BLD AUTO: 8 %
NEUTROPHILS # BLD AUTO: 2 X10(3)/MCL (ref 2.1–9.2)
NEUTROPHILS NFR BLD AUTO: 38.2 %
PLATELET # BLD AUTO: 199 X10(3)/MCL (ref 130–400)
PMV BLD AUTO: 9.7 FL (ref 9.4–12.4)
POTASSIUM SERPL-SCNC: 3.9 MMOL/L (ref 3.5–5.1)
PROT SERPL-MCNC: 6.6 GM/DL (ref 5.8–7.6)
PSA FREE MFR SERPL: NORMAL %
PSA FREE SERPL-MCNC: <0.1 NG/ML
PSA SERPL-MCNC: <0.1 NG/ML
RBC # BLD AUTO: 4.35 X10(6)/MCL (ref 4.7–6.1)
SODIUM SERPL-SCNC: 141 MMOL/L (ref 136–145)
WBC # SPEC AUTO: 5.4 X10(3)/MCL (ref 4.5–11.5)

## 2022-06-06 PROCEDURE — 84154 ASSAY OF PSA FREE: CPT

## 2022-06-06 PROCEDURE — 84153 ASSAY OF PSA TOTAL: CPT

## 2022-06-06 PROCEDURE — 85025 COMPLETE CBC W/AUTO DIFF WBC: CPT

## 2022-06-06 PROCEDURE — 36415 COLL VENOUS BLD VENIPUNCTURE: CPT

## 2022-06-06 PROCEDURE — 80053 COMPREHEN METABOLIC PANEL: CPT

## 2022-06-10 ENCOUNTER — OFFICE VISIT (OUTPATIENT)
Dept: HEMATOLOGY/ONCOLOGY | Facility: CLINIC | Age: 62
End: 2022-06-10
Payer: COMMERCIAL

## 2022-06-10 VITALS
BODY MASS INDEX: 32.72 KG/M2 | OXYGEN SATURATION: 98 % | HEART RATE: 87 BPM | HEIGHT: 71 IN | WEIGHT: 233.69 LBS | DIASTOLIC BLOOD PRESSURE: 90 MMHG | TEMPERATURE: 98 F | RESPIRATION RATE: 18 BRPM | SYSTOLIC BLOOD PRESSURE: 131 MMHG

## 2022-06-10 DIAGNOSIS — Z79.818 ANDROGEN DEPRIVATION THERAPY: ICD-10-CM

## 2022-06-10 DIAGNOSIS — C79.51 MALIGNANT NEOPLASM METASTATIC TO BONE: ICD-10-CM

## 2022-06-10 DIAGNOSIS — C61 PROSTATE CA: Primary | ICD-10-CM

## 2022-06-10 PROBLEM — K59.00 CONSTIPATION: Status: ACTIVE | Noted: 2021-07-18

## 2022-06-10 PROBLEM — J44.9 COPD (CHRONIC OBSTRUCTIVE PULMONARY DISEASE): Status: ACTIVE | Noted: 2021-07-14

## 2022-06-10 PROBLEM — I10 HTN (HYPERTENSION): Status: ACTIVE | Noted: 2021-07-14

## 2022-06-10 PROCEDURE — 99214 OFFICE O/P EST MOD 30 MIN: CPT | Mod: S$GLB,,, | Performed by: NURSE PRACTITIONER

## 2022-06-10 PROCEDURE — 99999 PR PBB SHADOW E&M-EST. PATIENT-LVL IV: CPT | Mod: PBBFAC,,, | Performed by: NURSE PRACTITIONER

## 2022-06-10 PROCEDURE — 99999 PR PBB SHADOW E&M-EST. PATIENT-LVL IV: ICD-10-PCS | Mod: PBBFAC,,, | Performed by: NURSE PRACTITIONER

## 2022-06-10 PROCEDURE — 99214 OFFICE O/P EST MOD 30 MIN: CPT | Mod: PBBFAC | Performed by: NURSE PRACTITIONER

## 2022-06-10 PROCEDURE — 99214 PR OFFICE/OUTPT VISIT, EST, LEVL IV, 30-39 MIN: ICD-10-PCS | Mod: S$GLB,,, | Performed by: NURSE PRACTITIONER

## 2022-06-10 RX ORDER — PSYLLIUM HUSK 0.4 G
1 CAPSULE ORAL
COMMUNITY

## 2022-06-10 RX ORDER — LEVOTHYROXINE SODIUM 75 UG/1
TABLET ORAL
COMMUNITY
Start: 2022-03-17

## 2022-06-10 RX ORDER — GABAPENTIN 300 MG/1
300 CAPSULE ORAL
COMMUNITY

## 2022-06-10 RX ORDER — ENZALUTAMIDE 40 MG/1
160 TABLET ORAL
COMMUNITY
End: 2022-08-19

## 2022-06-10 RX ORDER — AMLODIPINE BESYLATE AND ATORVASTATIN CALCIUM 10; 10 MG/1; MG/1
1 TABLET, FILM COATED ORAL DAILY
COMMUNITY

## 2022-06-10 RX ORDER — FUROSEMIDE 40 MG/1
40 TABLET ORAL
COMMUNITY

## 2022-06-10 RX ORDER — LOSARTAN POTASSIUM 50 MG/1
TABLET ORAL
COMMUNITY
Start: 2022-04-27

## 2022-06-10 RX ORDER — LORATADINE 10 MG/1
10 TABLET ORAL
COMMUNITY

## 2022-06-10 RX ORDER — MULTIVITAMIN
1 TABLET ORAL DAILY
COMMUNITY

## 2022-06-10 RX ORDER — ASPIRIN 81 MG/1
81 TABLET ORAL
COMMUNITY

## 2022-06-10 RX ORDER — UMECLIDINIUM BROMIDE AND VILANTEROL TRIFENATATE 62.5; 25 UG/1; UG/1
POWDER RESPIRATORY (INHALATION)
COMMUNITY
Start: 2021-07-02 | End: 2022-06-15 | Stop reason: SDUPTHER

## 2022-06-10 NOTE — PROGRESS NOTES
Subjective:       Patient ID: Manjinder Dong is a 61 y.o. male.    Chief Complaint: Follow-up (No new issues)      Diagnosis:  1.  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  2.  [place] 7 hormone sensitive metastatic prostate cancer with bone metastases diagnosed in May, 2015.  Previously followed by Dr. Heaton  3.  Bone pain secondary to #1.  well controlled.   4.  Hot flashes-- stable   5.  Chronic joint pain secondary to effects of Lupron and Xtandi    Current Treatment:   1.  Xtandi 160 mg po daily (written for 12/11/2018)  start date 12/2018  2.  Lupron every 6 months with Dr. Adams  3.  Xgeva 120 mg subcu monthly.  Changed to every 3 months  September 6, 2017.  Currently on hold due to gum issues     Treatment History:  1.  Taxotere ×6 cycles completed in 2016.  Exact dates uncertain based on available records from [facility]  2.  Lupron q 6 months started May 2015.    3.  Casodex stopped 11/2017  4.  Xofigo Q4W x 6 treatments  +  palliative radiation to back/hips-- completed #6 of 6   9/14/18  5.  Short course of XRT per Rad/Onc based on STAMPEDE study, plan for 20 fractions----started July 2020.    HPI  Mr. Dong is a very pleasant 61-year-old gentleman kindly referred by Dr. Adams for second opinion for known history of metastatic prostate cancer.  The patient's history is remarkable for rising PSA back in 2015 initially discovered to be 27 in January 2015, and subsequently rising to 44 by May 2015 which prompted the biopsy.  Biopsy revealed [place] 7 T4 being prostate cancer.  Scans done at that time included bone scan and CTs which revealed bone lesions throughout the thoracic and lumbar vertebral bodies along with the ribs, additional lesions in the pelvis, as well as enlarged left internal and external iliac metastatic nodes.  The patient reportedly  underwent radiation to the prostate although I do not have records to definitively confirm this.  He was started on Lupron as well as given 6 cycles of Taxotere with Dr. Heaton which was completed in 2016.   He seemed to tolerate treatment well and had significant decline in his PSA going down to 0.11 as of December 2015 and reportedly staying close to that value in the ensuing several months.  He was receiving every 6 month Lupron under the care of Dr. Adams and was also on Casodex.  He also received monthly Xgeva under the care of Dr. Heaton.  However, for various reasons he requested to have his oncologic care changed and was therefore referred to [facility].  He underwent MRI of the brain in April 2016 which showed no significant pathology.  Otherwise prior CT and bone scans were done in November 2015     Restaging CT and bone scans performed March 1, 2018 shows several sclerotic lesions in the thoracic spine, left scapula, right ribs. Most of the lesions that can be compared to the prior CT scan of the abdomen and pelvis performed at our [facility] in 2015 show relative stability. Corresponding bone scan done March 1, 2018 shows increased uptake at T7. Other areas noted on both bone scan that correspond to the CT scan, reveal sclerotic areas consistent with possible treated/healed metastases.  PSA on the same day was 0.52 .  MRI of the spine done 3/20/2018 noted sclerotic metastases to left L1 pedicle and iliac bones bilaterally.  Multilevel disc disease is present- worst at L4-5 where left paracentral and left foraminal disc extrusion is present causing suspected impingment of the left L4/L5 nerve roots. Focal bone marrow lesions in T1, T7 and L1 are consistent with metastases.  Patient then underwent T4/T5 spinal fusion surgery by Dr. Turner April 26, 2018, along with kyphoplasty of one vertebral body.  Patient then treated with Xofigo in August and September of 2018.          Restaging CT and bone scans ordered by Dr. Lane on 12/4/2018 showed stable osseous mets of the right acetabulum, b/l iliac bones, L1 vertebra, T-spine.  No soft tissue disease.  Patient was then started on Xtandi 160 mg/day during his visit on 12/11/2018.  PSAs showed good response.  Bilateral diagnostic MMG on 3/3/2020 ordered due to pain and gynecomastia showed no mammographic evidence of malignancy; bilateral gynecomastia is benign and read as BIRADS 2.  PSA monitoring continued and has remained stable, most recent level <0.02 on 3/9/2021.     CT scan of the chest, abdomen, and pelvis on 7/9/2021 showed stable osseous metastatic disease with no evidence of disease in the chest, abdomen, and pelvis.  Bone scan showed uptake in the right femoral proximal diaphyseal focal uptake concerning for metastatic lesion.     Called by patient's wife on 7/15/2021, the day after I saw him.  While shopping in [place], he stumbled and had a right femoral fracture and underwent surgery in [place] by Dr. Forman.  Biopsy taken.  Of note, I offered to have patient get MRI on 7/14/2021, but he requested that we do it next week.  He will likely need radiation.      Right femur fractur on 7/15/2021, underwent claudette placement for stabilization @ [facility] in [place], pathology showed fragments of skeletal muscle and bone with features consistent with fracture.  No evidence of metastatic carcinoma.    Interval History:   Patient is here today for follow-up for prostate cancer.  He remains on Xtandi and Lupron and continues to tolerate them fairly well.  He does have some joint pain but these issues are chronic and he denies any new areas of pain.  He states that the exposed area of jaw bone has healed and he is no longer having jaw pain.  He is still awaiting an appointment with Oral surgery.  He does have some hot flashes and night sweats but they are tolerable.  He states that he is doing well overall  and has no new complaints or concerns.     Past Medical History:   Diagnosis Date    HTN (hypertension)     Prostate CA       Past Surgical History:   Procedure Laterality Date    BACK SURGERY N/A     Lower back in 2000    INTRAMEDULLARY RODDING OF FEMUR Right 07/15/2001    PROSTATE BIOPSY N/A     SPINAL FUSION N/A     T4/T5 in April 2018     Social History     Socioeconomic History    Marital status:    Tobacco Use    Smoking status: Former Smoker    Smokeless tobacco: Never Used   Substance and Sexual Activity    Alcohol use: Yes    Drug use: Never      Family History   Family history unknown: Yes      Review of patient's allergies indicates:  No Known Allergies   Review of Systems   Constitutional: Negative for activity change, appetite change, chills, diaphoresis, fatigue, fever and unexpected weight change.   HENT: Negative for mouth sores.    Respiratory: Negative for cough and shortness of breath.    Cardiovascular: Negative for chest pain, palpitations and leg swelling.   Gastrointestinal: Negative for abdominal pain, blood in stool, change in bowel habit, constipation, diarrhea, nausea, vomiting and change in bowel habit.   Endocrine: Negative for cold intolerance.   Genitourinary: Negative for dysuria and hematuria.   Musculoskeletal: Positive for arthralgias.   Integumentary:  Negative for rash.   Neurological: Negative for dizziness, weakness, light-headedness, numbness and headaches.   Hematological: Negative for adenopathy. Does not bruise/bleed easily.   Psychiatric/Behavioral: Negative for confusion.         Objective:      Physical Exam  Vitals reviewed.   Constitutional:       General: He is not in acute distress.     Appearance: Normal appearance.   HENT:      Head: Normocephalic and atraumatic.   Eyes:      General: Lids are normal. No scleral icterus.     Extraocular Movements: Extraocular movements intact.      Conjunctiva/sclera: Conjunctivae normal.   Cardiovascular:       Rate and Rhythm: Normal rate and regular rhythm.      Heart sounds: Normal heart sounds.   Pulmonary:      Effort: Pulmonary effort is normal.      Breath sounds: Normal breath sounds.   Chest:   Breasts:      Right: No axillary adenopathy or supraclavicular adenopathy.      Left: No axillary adenopathy or supraclavicular adenopathy.       Abdominal:      General: Bowel sounds are normal. There is no distension.      Palpations: Abdomen is soft.      Tenderness: There is no abdominal tenderness. There is no guarding.   Musculoskeletal:         General: No swelling, tenderness or deformity.      Cervical back: Neck supple. No tenderness. No muscular tenderness.      Right lower leg: No edema.      Left lower leg: No edema.      Comments: Brace to the left lower extremity   Lymphadenopathy:      Cervical: No cervical adenopathy.      Upper Body:      Right upper body: No supraclavicular or axillary adenopathy.      Left upper body: No supraclavicular or axillary adenopathy.   Skin:     General: Skin is warm and dry.      Coloration: Skin is not jaundiced or pale.      Findings: No rash.   Neurological:      General: No focal deficit present.      Mental Status: He is alert and oriented to person, place, and time.      Motor: Motor function is intact. No weakness.      Gait: Gait is intact.   Psychiatric:         Mood and Affect: Mood normal.         Speech: Speech normal.         Behavior: Behavior normal. Behavior is cooperative.       LABS REVIEWED IN [facility]        Assessment:   1.  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  2.  [place] 7 hormone sensitive metastatic prostate cancer with bone metastases diagnosed in May, 2015.  Previously followed by Dr. Heaton  3.  Bone pain secondary to #1.  well controlled.   4.  Hot flashes-- stable   5.  Chronic joint pain secondary to effects of  Lupron and Xtandi        Plan:       At this time, the patient will continue on his Xtandi.      Recent scans showed stable disease with possible solitary met in right hip.  Prior to obtaining MRI (he was offered same-day MRI at appointment on 7/14/2021, however patient refused as he had to go tend to family affairs in [place]), he was walking, tripped, and had a right femur fracture.  Underwent stabilization and nail placement in [place], pathology revealed no evidence of metastatic carcinoma.     PSA stable     Continue to HOLD Xgeva due to exposed bone on the right side of his jaw-- follow up with oral surgery for further recommendations      He will continue Lupron every 6 months     Return to clinic in 3 months for OV and  clinical exam      Labs: CBC, CMP, and PSA     He knows to call with any new/worsening symptoms      All questions were answered to the best of my ability and the patient understands the plan moving forward.      RAHEL RangelC

## 2022-08-19 DIAGNOSIS — C61 PROSTATE CA: Primary | ICD-10-CM

## 2022-08-19 DIAGNOSIS — C79.51 MALIGNANT NEOPLASM METASTATIC TO BONE: ICD-10-CM

## 2022-08-19 RX ORDER — ENZALUTAMIDE 80 MG/1
160 TABLET ORAL DAILY
Qty: 60 TABLET | Refills: 1 | Status: SHIPPED | OUTPATIENT
Start: 2022-08-19 | End: 2022-11-11

## 2022-08-19 RX ORDER — ENZALUTAMIDE 80 MG/1
TABLET ORAL
COMMUNITY
Start: 2022-07-29 | End: 2022-08-19 | Stop reason: SDUPTHER

## 2022-09-09 ENCOUNTER — LAB VISIT (OUTPATIENT)
Dept: LAB | Facility: HOSPITAL | Age: 62
End: 2022-09-09
Attending: INTERNAL MEDICINE
Payer: COMMERCIAL

## 2022-09-09 DIAGNOSIS — C61 PROSTATE CA: ICD-10-CM

## 2022-09-09 DIAGNOSIS — C79.51 MALIGNANT NEOPLASM METASTATIC TO BONE: ICD-10-CM

## 2022-09-09 DIAGNOSIS — Z79.818 ANDROGEN DEPRIVATION THERAPY: ICD-10-CM

## 2022-09-09 LAB
ALBUMIN SERPL-MCNC: 4.1 GM/DL (ref 3.4–4.8)
ALBUMIN/GLOB SERPL: 1.6 RATIO (ref 1.1–2)
ALP SERPL-CCNC: 58 UNIT/L (ref 40–150)
ALT SERPL-CCNC: 25 UNIT/L (ref 0–55)
AST SERPL-CCNC: 25 UNIT/L (ref 5–34)
BASOPHILS # BLD AUTO: 0.04 X10(3)/MCL (ref 0–0.2)
BASOPHILS NFR BLD AUTO: 0.8 %
BILIRUBIN DIRECT+TOT PNL SERPL-MCNC: 0.6 MG/DL
BUN SERPL-MCNC: 23.2 MG/DL (ref 8.4–25.7)
CALCIUM SERPL-MCNC: 10.1 MG/DL (ref 8.8–10)
CHLORIDE SERPL-SCNC: 105 MMOL/L (ref 98–107)
CO2 SERPL-SCNC: 25 MMOL/L (ref 23–31)
CREAT SERPL-MCNC: 1.05 MG/DL (ref 0.73–1.18)
EOSINOPHIL # BLD AUTO: 0.42 X10(3)/MCL (ref 0–0.9)
EOSINOPHIL NFR BLD AUTO: 8.3 %
ERYTHROCYTE [DISTWIDTH] IN BLOOD BY AUTOMATED COUNT: 13.1 % (ref 11.5–17)
FREE/TOTAL PSA (OHS): NORMAL
GFR SERPLBLD CREATININE-BSD FMLA CKD-EPI: >60 MLS/MIN/1.73/M2
GLOBULIN SER-MCNC: 2.6 GM/DL (ref 2.4–3.5)
GLUCOSE SERPL-MCNC: 102 MG/DL (ref 82–115)
HCT VFR BLD AUTO: 40.3 % (ref 42–52)
HGB BLD-MCNC: 13.3 GM/DL (ref 14–18)
IMM GRANULOCYTES # BLD AUTO: 0.01 X10(3)/MCL (ref 0–0.04)
IMM GRANULOCYTES NFR BLD AUTO: 0.2 %
LYMPHOCYTES # BLD AUTO: 2.01 X10(3)/MCL (ref 0.6–4.6)
LYMPHOCYTES NFR BLD AUTO: 39.6 %
MCH RBC QN AUTO: 31.1 PG (ref 27–31)
MCHC RBC AUTO-ENTMCNC: 33 MG/DL (ref 33–36)
MCV RBC AUTO: 94.4 FL (ref 80–94)
MONOCYTES # BLD AUTO: 0.44 X10(3)/MCL (ref 0.1–1.3)
MONOCYTES NFR BLD AUTO: 8.7 %
NEUTROPHILS # BLD AUTO: 2.2 X10(3)/MCL (ref 2.1–9.2)
NEUTROPHILS NFR BLD AUTO: 42.4 %
PLATELET # BLD AUTO: 201 X10(3)/MCL (ref 130–400)
PMV BLD AUTO: 9.2 FL (ref 7.4–10.4)
POTASSIUM SERPL-SCNC: 4.2 MMOL/L (ref 3.5–5.1)
PROT SERPL-MCNC: 6.7 GM/DL (ref 5.8–7.6)
PSA FREE MFR SERPL: NORMAL %
PSA FREE SERPL-MCNC: <0.1 NG/ML
PSA SERPL-MCNC: <0.1 NG/ML
RBC # BLD AUTO: 4.27 X10(6)/MCL (ref 4.7–6.1)
SODIUM SERPL-SCNC: 140 MMOL/L (ref 136–145)
WBC # SPEC AUTO: 5.1 X10(3)/MCL (ref 4.5–11.5)

## 2022-09-09 PROCEDURE — 36415 COLL VENOUS BLD VENIPUNCTURE: CPT

## 2022-09-09 PROCEDURE — 80053 COMPREHEN METABOLIC PANEL: CPT

## 2022-09-09 PROCEDURE — 85025 COMPLETE CBC W/AUTO DIFF WBC: CPT

## 2022-09-09 PROCEDURE — 84154 ASSAY OF PSA FREE: CPT | Mod: 59

## 2022-09-09 PROCEDURE — 84153 ASSAY OF PSA TOTAL: CPT

## 2022-09-12 ENCOUNTER — OFFICE VISIT (OUTPATIENT)
Dept: HEMATOLOGY/ONCOLOGY | Facility: CLINIC | Age: 62
End: 2022-09-12
Payer: COMMERCIAL

## 2022-09-12 VITALS
HEIGHT: 71 IN | OXYGEN SATURATION: 98 % | TEMPERATURE: 98 F | DIASTOLIC BLOOD PRESSURE: 78 MMHG | SYSTOLIC BLOOD PRESSURE: 123 MMHG | HEART RATE: 74 BPM | RESPIRATION RATE: 18 BRPM | BODY MASS INDEX: 32.87 KG/M2 | WEIGHT: 234.81 LBS

## 2022-09-12 DIAGNOSIS — C79.51 MALIGNANT NEOPLASM METASTATIC TO BONE: ICD-10-CM

## 2022-09-12 DIAGNOSIS — C61 PROSTATE CA: Primary | ICD-10-CM

## 2022-09-12 PROCEDURE — 99999 PR PBB SHADOW E&M-EST. PATIENT-LVL IV: ICD-10-PCS | Mod: PBBFAC,,, | Performed by: INTERNAL MEDICINE

## 2022-09-12 PROCEDURE — 99214 OFFICE O/P EST MOD 30 MIN: CPT | Mod: S$GLB,,, | Performed by: INTERNAL MEDICINE

## 2022-09-12 PROCEDURE — 99999 PR PBB SHADOW E&M-EST. PATIENT-LVL IV: CPT | Mod: PBBFAC,,, | Performed by: INTERNAL MEDICINE

## 2022-09-12 PROCEDURE — 99214 OFFICE O/P EST MOD 30 MIN: CPT | Mod: PBBFAC | Performed by: INTERNAL MEDICINE

## 2022-09-12 PROCEDURE — 99214 PR OFFICE/OUTPT VISIT, EST, LEVL IV, 30-39 MIN: ICD-10-PCS | Mod: S$GLB,,, | Performed by: INTERNAL MEDICINE

## 2022-09-12 NOTE — PROGRESS NOTES
Subjective:       Patient ID: Manjinder Dong is a 62 y.o. male.    Chief Complaint: Follow-up (3 month. Patient stated no new problems )      Diagnosis:  1.  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  2.  [place] 7 hormone sensitive metastatic prostate cancer with bone metastases diagnosed in May, 2015.  Previously followed by Dr. Heaton  3.  Bone pain secondary to #1.  well controlled.   4.  Hot flashes-- stable   5.  Chronic joint pain secondary to effects of Lupron and Xtandi    Current Treatment:   1.  Xtandi 160 mg po daily (written for 12/11/2018)  start date 12/2018  2.  Lupron every 6 months with Dr. Adams  3.  Xgeva 120 mg subcu monthly.  Changed to every 3 months  September 6, 2017.  Currently on hold due to gum issues     Treatment History:  1.  Taxotere ×6 cycles completed in 2016.  Exact dates uncertain based on available records from [facility]  2.  Lupron q 6 months started May 2015.    3.  Casodex stopped 11/2017  4.  Xofigo Q4W x 6 treatments  +  palliative radiation to back/hips-- completed #6 of 6   9/14/18  5.  Short course of XRT per Rad/Onc based on STAMPEDE study, plan for 20 fractions----started July 2020.    HPI  Mr. Dong is a very pleasant 61-year-old gentleman kindly referred by Dr. Adams for second opinion for known history of metastatic prostate cancer.  The patient's history is remarkable for rising PSA back in 2015 initially discovered to be 27 in January 2015, and subsequently rising to 44 by May 2015 which prompted the biopsy.  Biopsy revealed Bailey 7 T4 being prostate cancer.  Scans done at that time included bone scan and CTs which revealed bone lesions throughout the thoracic and lumbar vertebral bodies along with the ribs, additional lesions in the pelvis, as well as enlarged left internal and external iliac metastatic nodes.   The patient reportedly underwent radiation to the prostate although I do not have records to definitively confirm this.  He was started on Lupron as well as given 6 cycles of Taxotere with Dr. Heaton which was completed in 2016.   He seemed to tolerate treatment well and had significant decline in his PSA going down to 0.11 as of December 2015 and reportedly staying close to that value in the ensuing several months.  He was receiving every 6 month Lupron under the care of Dr. Adams and was also on Casodex.  He also received monthly Xgeva under the care of Dr. Heaton.  However, for various reasons he requested to have his oncologic care changed and was therefore referred to [facility].  He underwent MRI of the brain in April 2016 which showed no significant pathology.  Otherwise prior CT and bone scans were done in November 2015     Restaging CT and bone scans performed March 1, 2018 shows several sclerotic lesions in the thoracic spine, left scapula, right ribs. Most of the lesions that can be compared to the prior CT scan of the abdomen and pelvis performed at our [facility] in 2015 show relative stability. Corresponding bone scan done March 1, 2018 shows increased uptake at T7. Other areas noted on both bone scan that correspond to the CT scan, reveal sclerotic areas consistent with possible treated/healed metastases.  PSA on the same day was 0.52 .  MRI of the spine done 3/20/2018 noted sclerotic metastases to left L1 pedicle and iliac bones bilaterally.  Multilevel disc disease is present- worst at L4-5 where left paracentral and left foraminal disc extrusion is present causing suspected impingment of the left L4/L5 nerve roots. Focal bone marrow lesions in T1, T7 and L1 are consistent with metastases.  Patient then underwent T4/T5 spinal fusion surgery by Dr. Turner April 26, 2018, along with kyphoplasty of one vertebral body.  Patient then treated with Xofigo in August and September  of 2018.         Restaging CT and bone scans ordered by Dr. Lane on 12/4/2018 showed stable osseous mets of the right acetabulum, b/l iliac bones, L1 vertebra, T-spine.  No soft tissue disease.  Patient was then started on Xtandi 160 mg/day during his visit on 12/11/2018.  PSAs showed good response.  Bilateral diagnostic MMG on 3/3/2020 ordered due to pain and gynecomastia showed no mammographic evidence of malignancy; bilateral gynecomastia is benign and read as BIRADS 2.  PSA monitoring continued and has remained stable, most recent level <0.02 on 3/9/2021.     CT scan of the chest, abdomen, and pelvis on 7/9/2021 showed stable osseous metastatic disease with no evidence of disease in the chest, abdomen, and pelvis.  Bone scan showed uptake in the right femoral proximal diaphyseal focal uptake concerning for metastatic lesion.     Called by patient's wife on 7/15/2021, the day after I saw him.  While shopping in [place], he stumbled and had a right femoral fracture and underwent surgery in [place] by Dr. Forman.  Biopsy taken.  Of note, I offered to have patient get MRI on 7/14/2021, but he requested that we do it next week.  He will likely need radiation.      Right femur fracture on 7/15/2021, underwent claudette placement for stabilization @ [facility] in [place], pathology showed fragments of skeletal muscle and bone with features consistent with fracture.  No evidence of metastatic carcinoma.    Interval History:   Patient is here today for follow-up for prostate cancer.  He remains on Xtandi and Lupron and continues to tolerate them fairly well. He does have some dizziness.      Past Medical History:   Diagnosis Date    HTN (hypertension)     Prostate CA       Past Surgical History:   Procedure Laterality Date    BACK SURGERY N/A     Lower back in 2000    INTRAMEDULLARY RODDING OF FEMUR Right 07/15/2001    PROSTATE BIOPSY N/A     SPINAL FUSION N/A     T4/T5 in April 2018     Social  History     Socioeconomic History    Marital status:    Tobacco Use    Smoking status: Former    Smokeless tobacco: Never   Substance and Sexual Activity    Alcohol use: Yes    Drug use: Never      Family History   Family history unknown: Yes      Review of patient's allergies indicates:  No Known Allergies   Review of Systems   Constitutional:  Negative for activity change, appetite change, chills, diaphoresis, fatigue, fever and unexpected weight change.   HENT:  Negative for mouth sores.    Respiratory:  Negative for cough and shortness of breath.    Cardiovascular:  Negative for chest pain, palpitations and leg swelling.   Gastrointestinal:  Negative for abdominal pain, blood in stool, change in bowel habit, constipation, diarrhea, nausea, vomiting and change in bowel habit.   Endocrine: Negative for cold intolerance.   Genitourinary:  Negative for dysuria and hematuria.   Musculoskeletal:  Positive for arthralgias.   Integumentary:  Negative for rash.   Neurological:  Negative for dizziness, weakness, light-headedness, numbness and headaches.   Hematological:  Negative for adenopathy. Does not bruise/bleed easily.   Psychiatric/Behavioral:  Negative for confusion.        Objective:      Physical Exam  Vitals reviewed.   Constitutional:       General: He is not in acute distress.     Appearance: Normal appearance.   HENT:      Head: Normocephalic and atraumatic.   Eyes:      General: Lids are normal. No scleral icterus.     Extraocular Movements: Extraocular movements intact.      Conjunctiva/sclera: Conjunctivae normal.   Cardiovascular:      Rate and Rhythm: Normal rate and regular rhythm.      Heart sounds: Normal heart sounds.   Pulmonary:      Effort: Pulmonary effort is normal.      Breath sounds: Normal breath sounds.   Abdominal:      General: Bowel sounds are normal. There is no distension.      Palpations: Abdomen is soft.      Tenderness: There is no abdominal tenderness. There is no guarding.    Musculoskeletal:         General: No swelling, tenderness or deformity.      Cervical back: Neck supple. No tenderness. No muscular tenderness.      Right lower leg: No edema.      Left lower leg: No edema.      Comments: Brace to the left lower extremity   Lymphadenopathy:      Cervical: No cervical adenopathy.      Upper Body:      Right upper body: No supraclavicular or axillary adenopathy.      Left upper body: No supraclavicular or axillary adenopathy.   Skin:     General: Skin is warm and dry.      Coloration: Skin is not jaundiced or pale.      Findings: No rash.   Neurological:      General: No focal deficit present.      Mental Status: He is alert and oriented to person, place, and time.      Motor: Motor function is intact. No weakness.      Gait: Gait is intact.   Psychiatric:         Mood and Affect: Mood normal.         Speech: Speech normal.         Behavior: Behavior normal. Behavior is cooperative.     LABS REVIEWED IN [facility]        Assessment:     1.  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  2.  [place] 7 hormone sensitive metastatic prostate cancer with bone metastases diagnosed in May, 2015.  Previously followed by Dr. Heaton  3.  Bone pain secondary to #1.  well controlled.   4.  Hot flashes-- stable   5.  Chronic joint pain secondary to effects of Lupron and Xtandi        Plan:       At this time, the patient will continue on his Xtandi.      Recent scans showed stable disease with possible solitary met in right hip.  Prior to obtaining MRI (he was offered same-day MRI at appointment on 7/14/2021, however patient refused as he had to go tend to family affairs in [place]), he was walking, tripped, and had a right femur fracture.  Underwent stabilization and nail placement in [place], pathology revealed no evidence of metastatic carcinoma.     PSA stable      Continue to HOLD Xgeva due to exposed bone on the right side of his jaw-- follow up with oral surgery for further recommendations      He will continue Lupron every 6 months with Dr. Adams     Return to clinic in 3 months for OV and  clinical exam      Labs: CBC, CMP, and PSA     He knows to call with any new/worsening symptoms          Jorge Flores II, MD

## 2022-10-21 DIAGNOSIS — C61 PROSTATE CA: ICD-10-CM

## 2022-10-21 DIAGNOSIS — C79.51 MALIGNANT NEOPLASM METASTATIC TO BONE: ICD-10-CM

## 2022-11-11 ENCOUNTER — TELEPHONE (OUTPATIENT)
Dept: HEMATOLOGY/ONCOLOGY | Facility: CLINIC | Age: 62
End: 2022-11-11
Payer: COMMERCIAL

## 2022-11-11 RX ORDER — ENZALUTAMIDE 80 MG/1
TABLET ORAL
Qty: 60 TABLET | Refills: 2 | Status: SHIPPED | OUTPATIENT
Start: 2022-11-11 | End: 2022-11-14 | Stop reason: SDUPTHER

## 2022-11-14 DIAGNOSIS — C79.51 MALIGNANT NEOPLASM METASTATIC TO BONE: ICD-10-CM

## 2022-11-14 DIAGNOSIS — C61 PROSTATE CA: ICD-10-CM

## 2022-11-14 RX ORDER — ENZALUTAMIDE 80 MG/1
2 TABLET ORAL DAILY
Qty: 60 TABLET | Refills: 2 | Status: SHIPPED | OUTPATIENT
Start: 2022-11-14 | End: 2023-02-22

## 2022-12-05 ENCOUNTER — LAB VISIT (OUTPATIENT)
Dept: LAB | Facility: HOSPITAL | Age: 62
End: 2022-12-05
Attending: INTERNAL MEDICINE
Payer: COMMERCIAL

## 2022-12-05 DIAGNOSIS — C61 PROSTATE CA: ICD-10-CM

## 2022-12-05 DIAGNOSIS — C79.51 MALIGNANT NEOPLASM METASTATIC TO BONE: ICD-10-CM

## 2022-12-05 LAB
ALBUMIN SERPL-MCNC: 4 GM/DL (ref 3.4–4.8)
ALBUMIN/GLOB SERPL: 1.6 RATIO (ref 1.1–2)
ALP SERPL-CCNC: 62 UNIT/L (ref 40–150)
ALT SERPL-CCNC: 25 UNIT/L (ref 0–55)
AST SERPL-CCNC: 21 UNIT/L (ref 5–34)
BASOPHILS # BLD AUTO: 0.05 X10(3)/MCL (ref 0–0.2)
BASOPHILS NFR BLD AUTO: 0.8 %
BILIRUBIN DIRECT+TOT PNL SERPL-MCNC: 0.5 MG/DL
BUN SERPL-MCNC: 23.3 MG/DL (ref 8.4–25.7)
CALCIUM SERPL-MCNC: 10.2 MG/DL (ref 8.8–10)
CHLORIDE SERPL-SCNC: 105 MMOL/L (ref 98–107)
CO2 SERPL-SCNC: 25 MMOL/L (ref 23–31)
CREAT SERPL-MCNC: 1.12 MG/DL (ref 0.73–1.18)
EOSINOPHIL # BLD AUTO: 0.43 X10(3)/MCL (ref 0–0.9)
EOSINOPHIL NFR BLD AUTO: 7.2 %
ERYTHROCYTE [DISTWIDTH] IN BLOOD BY AUTOMATED COUNT: 12.8 % (ref 11.5–17)
FREE/TOTAL PSA (OHS): NORMAL
GFR SERPLBLD CREATININE-BSD FMLA CKD-EPI: >60 MLS/MIN/1.73/M2
GLOBULIN SER-MCNC: 2.5 GM/DL (ref 2.4–3.5)
GLUCOSE SERPL-MCNC: 94 MG/DL (ref 82–115)
HCT VFR BLD AUTO: 40 % (ref 42–52)
HGB BLD-MCNC: 13.3 GM/DL (ref 14–18)
IMM GRANULOCYTES # BLD AUTO: 0.01 X10(3)/MCL (ref 0–0.04)
IMM GRANULOCYTES NFR BLD AUTO: 0.2 %
LYMPHOCYTES # BLD AUTO: 2.44 X10(3)/MCL (ref 0.6–4.6)
LYMPHOCYTES NFR BLD AUTO: 41 %
MCH RBC QN AUTO: 31.4 PG (ref 27–31)
MCHC RBC AUTO-ENTMCNC: 33.3 MG/DL (ref 33–36)
MCV RBC AUTO: 94.6 FL (ref 80–94)
MONOCYTES # BLD AUTO: 0.5 X10(3)/MCL (ref 0.1–1.3)
MONOCYTES NFR BLD AUTO: 8.4 %
NEUTROPHILS # BLD AUTO: 2.5 X10(3)/MCL (ref 2.1–9.2)
NEUTROPHILS NFR BLD AUTO: 42.4 %
PLATELET # BLD AUTO: 214 X10(3)/MCL (ref 130–400)
PMV BLD AUTO: 9.3 FL (ref 7.4–10.4)
POTASSIUM SERPL-SCNC: 4.2 MMOL/L (ref 3.5–5.1)
PROT SERPL-MCNC: 6.5 GM/DL (ref 5.8–7.6)
PSA FREE MFR SERPL: NORMAL %
PSA FREE SERPL-MCNC: <0.1 NG/ML
PSA SERPL-MCNC: <0.1 NG/ML
RBC # BLD AUTO: 4.23 X10(6)/MCL (ref 4.7–6.1)
SODIUM SERPL-SCNC: 140 MMOL/L (ref 136–145)
WBC # SPEC AUTO: 6 X10(3)/MCL (ref 4.5–11.5)

## 2022-12-05 PROCEDURE — 85025 COMPLETE CBC W/AUTO DIFF WBC: CPT

## 2022-12-05 PROCEDURE — 84154 ASSAY OF PSA FREE: CPT

## 2022-12-05 PROCEDURE — 36415 COLL VENOUS BLD VENIPUNCTURE: CPT

## 2022-12-05 PROCEDURE — 80053 COMPREHEN METABOLIC PANEL: CPT

## 2022-12-05 PROCEDURE — 84153 ASSAY OF PSA TOTAL: CPT

## 2022-12-13 ENCOUNTER — OFFICE VISIT (OUTPATIENT)
Dept: HEMATOLOGY/ONCOLOGY | Facility: CLINIC | Age: 62
End: 2022-12-13
Payer: COMMERCIAL

## 2022-12-13 VITALS
HEART RATE: 73 BPM | TEMPERATURE: 98 F | HEIGHT: 72 IN | WEIGHT: 233 LBS | SYSTOLIC BLOOD PRESSURE: 119 MMHG | OXYGEN SATURATION: 98 % | RESPIRATION RATE: 18 BRPM | DIASTOLIC BLOOD PRESSURE: 81 MMHG | BODY MASS INDEX: 31.56 KG/M2

## 2022-12-13 DIAGNOSIS — C79.51 MALIGNANT NEOPLASM METASTATIC TO BONE: ICD-10-CM

## 2022-12-13 DIAGNOSIS — Z79.899 ON ANTINEOPLASTIC CHEMOTHERAPY: ICD-10-CM

## 2022-12-13 DIAGNOSIS — C61 PROSTATE CA: Primary | ICD-10-CM

## 2022-12-13 PROCEDURE — 99214 OFFICE O/P EST MOD 30 MIN: CPT | Mod: PBBFAC | Performed by: NURSE PRACTITIONER

## 2022-12-13 PROCEDURE — 99214 PR OFFICE/OUTPT VISIT, EST, LEVL IV, 30-39 MIN: ICD-10-PCS | Mod: S$GLB,,, | Performed by: NURSE PRACTITIONER

## 2022-12-13 PROCEDURE — 99214 OFFICE O/P EST MOD 30 MIN: CPT | Mod: S$GLB,,, | Performed by: NURSE PRACTITIONER

## 2022-12-13 PROCEDURE — 99999 PR PBB SHADOW E&M-EST. PATIENT-LVL IV: ICD-10-PCS | Mod: PBBFAC,,, | Performed by: NURSE PRACTITIONER

## 2022-12-13 PROCEDURE — 99999 PR PBB SHADOW E&M-EST. PATIENT-LVL IV: CPT | Mod: PBBFAC,,, | Performed by: NURSE PRACTITIONER

## 2022-12-13 NOTE — PROGRESS NOTES
Subjective:       Patient ID: Manjinder Dong is a 62 y.o. male.    Chief Complaint: Follow-up (Patient stated no new problems )        Diagnosis:  1.  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  2.  [place] 7 hormone sensitive metastatic prostate cancer with bone metastases diagnosed in May, 2015.  Previously followed by Dr. Heaton  3.  Bone pain secondary to #1.  well controlled.   4.  Hot flashes-- stable   5.  Chronic joint pain secondary to effects of Lupron and Xtandi    Current Treatment:   1.  Xtandi 160 mg po daily (written for 12/11/2018)  start date 12/2018  2.  Lupron every 6 months with Dr. Adams  3.  Xgeva 120 mg subcu monthly.  Changed to every 3 months  September 6, 2017.  Currently on hold due to gum issues     Treatment History:  1.  Taxotere ×6 cycles completed in 2016.  Exact dates uncertain based on available records from [facility]  2.  Lupron q 6 months started May 2015.    3.  Casodex stopped 11/2017  4.  Xofigo Q4W x 6 treatments  +  palliative radiation to back/hips-- completed #6 of 6   9/14/18  5.  Short course of XRT per Rad/Onc based on STAMPEDE study, plan for 20 fractions----started July 2020.    HPI  Mr. Dong is a very pleasant 62-year-old gentleman kindly referred by Dr. Adams for second opinion for known history of metastatic prostate cancer.  The patient's history is remarkable for rising PSA back in 2015 initially discovered to be 27 in January 2015, and subsequently rising to 44 by May 2015 which prompted the biopsy.  Biopsy revealed [place] 7 T4 being prostate cancer.  Scans done at that time included bone scan and CTs which revealed bone lesions throughout the thoracic and lumbar vertebral bodies along with the ribs, additional lesions in the pelvis, as well as enlarged left internal and external iliac metastatic nodes.  The  patient reportedly underwent radiation to the prostate although I do not have records to definitively confirm this.  He was started on Lupron as well as given 6 cycles of Taxotere with Dr. Heaton which was completed in 2016.   He seemed to tolerate treatment well and had significant decline in his PSA going down to 0.11 as of December 2015 and reportedly staying close to that value in the ensuing several months.  He was receiving every 6 month Lupron under the care of Dr. Adams and was also on Casodex.  He also received monthly Xgeva under the care of Dr. Heaton.  However, for various reasons he requested to have his oncologic care changed and was therefore referred to [facility].  He underwent MRI of the brain in April 2016 which showed no significant pathology.  Otherwise prior CT and bone scans were done in November 2015     Restaging CT and bone scans performed March 1, 2018 shows several sclerotic lesions in the thoracic spine, left scapula, right ribs. Most of the lesions that can be compared to the prior CT scan of the abdomen and pelvis performed at our [facility] in 2015 show relative stability. Corresponding bone scan done March 1, 2018 shows increased uptake at T7. Other areas noted on both bone scan that correspond to the CT scan, reveal sclerotic areas consistent with possible treated/healed metastases.  PSA on the same day was 0.52 .  MRI of the spine done 3/20/2018 noted sclerotic metastases to left L1 pedicle and iliac bones bilaterally.  Multilevel disc disease is present- worst at L4-5 where left paracentral and left foraminal disc extrusion is present causing suspected impingment of the left L4/L5 nerve roots. Focal bone marrow lesions in T1, T7 and L1 are consistent with metastases.  Patient then underwent T4/T5 spinal fusion surgery by Dr. Turner April 26, 2018, along with kyphoplasty of one vertebral body.  Patient then treated with Xofigo in August and September of  2018.         Restaging CT and bone scans ordered by Dr. Lane on 12/4/2018 showed stable osseous mets of the right acetabulum, b/l iliac bones, L1 vertebra, T-spine.  No soft tissue disease.  Patient was then started on Xtandi 160 mg/day during his visit on 12/11/2018.  PSAs showed good response.  Bilateral diagnostic MMG on 3/3/2020 ordered due to pain and gynecomastia showed no mammographic evidence of malignancy; bilateral gynecomastia is benign and read as BIRADS 2.  PSA monitoring continued and has remained stable, most recent level <0.02 on 3/9/2021.     CT scan of the chest, abdomen, and pelvis on 7/9/2021 showed stable osseous metastatic disease with no evidence of disease in the chest, abdomen, and pelvis.  Bone scan showed uptake in the right femoral proximal diaphyseal focal uptake concerning for metastatic lesion.     Called by patient's wife on 7/15/2021, the day after I saw him.  While shopping in [place], he stumbled and had a right femoral fracture and underwent surgery in [place] by Dr. Forman.  Biopsy taken.  Of note, I offered to have patient get MRI on 7/14/2021, but he requested that we do it next week.  He will likely need radiation.      Right femur fracture on 7/15/2021, underwent claudette placement for stabilization @ [facility] in [place], pathology showed fragments of skeletal muscle and bone with features consistent with fracture.  No evidence of metastatic carcinoma.    Interval History:   Patient is here today for follow-up for prostate cancer. He remains on Xtandi and Lupron and continues to tolerate them fairly well. He does have some dizziness, hot flashes, and arthralgias which all remain relatively stable. His bowels fluctuate but he is able to manage them with OTC medication.     Past Medical History:   Diagnosis Date    Arthritis     COPD (chronic obstructive pulmonary disease)     HTN (hypertension)     Malignant neoplasm metastatic to bone     Prostate CA        Past Surgical History:   Procedure Laterality Date    BACK SURGERY N/A     Lower back in     FRACTURE SURGERY      INTRAMEDULLARY RODDING OF FEMUR Right 07/15/2001    PROSTATE BIOPSY N/A     SPINAL FUSION N/A     T4/T5 in 2018     Social History     Socioeconomic History    Marital status:    Tobacco Use    Smoking status: Former     Packs/day: 1.50     Years: 35.00     Pack years: 52.50     Types: Cigarettes     Quit date: 2015     Years since quittin.9    Smokeless tobacco: Never   Substance and Sexual Activity    Alcohol use: Yes    Drug use: Never      Family History   Family history unknown: Yes      Review of patient's allergies indicates:  No Known Allergies   Review of Systems   Constitutional:  Negative for activity change, appetite change, chills, diaphoresis, fatigue, fever and unexpected weight change.   HENT:  Negative for mouth sores.    Eyes:  Negative for visual disturbance.   Respiratory:  Negative for cough and shortness of breath.    Cardiovascular:  Negative for chest pain, palpitations and leg swelling.   Gastrointestinal:  Negative for abdominal pain, blood in stool, change in bowel habit, constipation, diarrhea, nausea, vomiting and change in bowel habit.   Endocrine: Negative for cold intolerance.   Genitourinary:  Negative for dysuria, frequency and hematuria.   Musculoskeletal:  Positive for arthralgias. Negative for back pain.   Integumentary:  Negative for rash.   Neurological:  Negative for dizziness, weakness, light-headedness, numbness and headaches.   Hematological:  Negative for adenopathy. Does not bruise/bleed easily.   Psychiatric/Behavioral:  Negative for confusion. The patient is not nervous/anxious.        Objective:      Physical Exam  Vitals reviewed.   Constitutional:       General: He is not in acute distress.     Appearance: Normal appearance.   HENT:      Head: Normocephalic and atraumatic.   Eyes:      General: Lids are normal. No scleral  icterus.     Extraocular Movements: Extraocular movements intact.      Conjunctiva/sclera: Conjunctivae normal.   Cardiovascular:      Rate and Rhythm: Normal rate and regular rhythm.      Heart sounds: Normal heart sounds.   Pulmonary:      Effort: Pulmonary effort is normal.      Breath sounds: Normal breath sounds.   Abdominal:      General: Bowel sounds are normal. There is no distension.      Palpations: Abdomen is soft.      Tenderness: There is no abdominal tenderness. There is no guarding.   Musculoskeletal:         General: No swelling, tenderness or deformity.      Cervical back: Neck supple. No tenderness. No muscular tenderness.      Right lower leg: No edema.      Left lower leg: No edema.      Comments: Brace to the left lower extremity   Lymphadenopathy:      Cervical: No cervical adenopathy.      Upper Body:      Right upper body: No supraclavicular or axillary adenopathy.      Left upper body: No supraclavicular or axillary adenopathy.   Skin:     General: Skin is warm and dry.      Coloration: Skin is not jaundiced or pale.      Findings: No rash.   Neurological:      General: No focal deficit present.      Mental Status: He is alert and oriented to person, place, and time.      Motor: Motor function is intact. No weakness.      Gait: Gait is intact.   Psychiatric:         Mood and Affect: Mood normal.         Speech: Speech normal.         Behavior: Behavior normal. Behavior is cooperative.     LABS REVIEWED IN [facility]        Assessment:     1.  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  2.  [place] 7 hormone sensitive metastatic prostate cancer with bone metastases diagnosed in May, 2015.  Previously followed by Dr. Heaton  3.  Bone pain secondary to #1.  well controlled.   4.  Hot flashes-- stable   5.  Chronic joint pain secondary to effects of Lupron and Xtandi         Plan:       At this time, the patient will continue on his Xtandi.      Recent scans showed stable disease with possible solitary met in right hip.  Prior to obtaining MRI (he was offered same-day MRI at appointment on 7/14/2021, however patient refused as he had to go tend to family affairs in [place]), he was walking, tripped, and had a right femur fracture.  Underwent stabilization and nail placement in [place], pathology revealed no evidence of metastatic carcinoma.     PSA stable     Continue to HOLD Xgeva due to exposed bone on the right side of his jaw-- follow up with oral surgery for further recommendations      He will continue Lupron every 6 months with Dr. Adams     Return to clinic in 3 months for OV and  clinical exam      Labs: CBC, CMP, and PSA     He knows to call with any new/worsening symptoms          MARLYS Rangel

## 2023-01-11 DIAGNOSIS — C61 PROSTATE CANCER: Primary | ICD-10-CM

## 2023-02-06 ENCOUNTER — HOSPITAL ENCOUNTER (OUTPATIENT)
Dept: RADIOLOGY | Facility: HOSPITAL | Age: 63
Discharge: HOME | End: 2023-02-06
Attending: UROLOGY
Payer: COMMERCIAL

## 2023-02-06 DIAGNOSIS — C61 PROSTATE CANCER: ICD-10-CM

## 2023-02-06 PROCEDURE — 78815 PET IMAGE W/CT SKULL-THIGH: CPT | Mod: TC,PS

## 2023-02-08 DIAGNOSIS — C79.51 MALIGNANT NEOPLASM METASTATIC TO BONE: ICD-10-CM

## 2023-02-08 DIAGNOSIS — C61 PROSTATE CA: ICD-10-CM

## 2023-02-22 DIAGNOSIS — C79.51 MALIGNANT NEOPLASM METASTATIC TO BONE: ICD-10-CM

## 2023-02-22 DIAGNOSIS — C61 PROSTATE CA: ICD-10-CM

## 2023-02-22 RX ORDER — ENZALUTAMIDE 80 MG/1
2 TABLET ORAL DAILY
Qty: 60 TABLET | Refills: 2 | Status: SHIPPED | OUTPATIENT
Start: 2023-02-22 | End: 2023-05-12 | Stop reason: SDUPTHER

## 2023-03-10 ENCOUNTER — LAB VISIT (OUTPATIENT)
Dept: LAB | Facility: HOSPITAL | Age: 63
End: 2023-03-10
Payer: COMMERCIAL

## 2023-03-10 DIAGNOSIS — C79.51 MALIGNANT NEOPLASM METASTATIC TO BONE: ICD-10-CM

## 2023-03-10 DIAGNOSIS — C61 PROSTATE CA: ICD-10-CM

## 2023-03-10 LAB
ALBUMIN SERPL-MCNC: 3.9 G/DL (ref 3.4–4.8)
ALBUMIN/GLOB SERPL: 1.6 RATIO (ref 1.1–2)
ALP SERPL-CCNC: 54 UNIT/L (ref 40–150)
ALT SERPL-CCNC: 25 UNIT/L (ref 0–55)
AST SERPL-CCNC: 23 UNIT/L (ref 5–34)
BASOPHILS # BLD AUTO: 0.05 X10(3)/MCL (ref 0–0.2)
BASOPHILS NFR BLD AUTO: 1 %
BILIRUBIN DIRECT+TOT PNL SERPL-MCNC: 0.5 MG/DL
BUN SERPL-MCNC: 23.9 MG/DL (ref 8.4–25.7)
CALCIUM SERPL-MCNC: 9.7 MG/DL (ref 8.8–10)
CHLORIDE SERPL-SCNC: 108 MMOL/L (ref 98–107)
CO2 SERPL-SCNC: 25 MMOL/L (ref 23–31)
CREAT SERPL-MCNC: 0.99 MG/DL (ref 0.73–1.18)
EOSINOPHIL # BLD AUTO: 0.29 X10(3)/MCL (ref 0–0.9)
EOSINOPHIL NFR BLD AUTO: 6 %
ERYTHROCYTE [DISTWIDTH] IN BLOOD BY AUTOMATED COUNT: 13.1 % (ref 11.5–17)
FREE/TOTAL PSA (OHS): NORMAL
GFR SERPLBLD CREATININE-BSD FMLA CKD-EPI: >60 MLS/MIN/1.73/M2
GLOBULIN SER-MCNC: 2.4 GM/DL (ref 2.4–3.5)
GLUCOSE SERPL-MCNC: 103 MG/DL (ref 82–115)
HCT VFR BLD AUTO: 39.4 % (ref 42–52)
HGB BLD-MCNC: 12.8 G/DL (ref 14–18)
IMM GRANULOCYTES # BLD AUTO: 0.01 X10(3)/MCL (ref 0–0.04)
IMM GRANULOCYTES NFR BLD AUTO: 0.2 %
LYMPHOCYTES # BLD AUTO: 1.98 X10(3)/MCL (ref 0.6–4.6)
LYMPHOCYTES NFR BLD AUTO: 40.9 %
MCH RBC QN AUTO: 31.1 PG
MCHC RBC AUTO-ENTMCNC: 32.5 G/DL (ref 33–36)
MCV RBC AUTO: 95.6 FL (ref 80–94)
MONOCYTES # BLD AUTO: 0.45 X10(3)/MCL (ref 0.1–1.3)
MONOCYTES NFR BLD AUTO: 9.3 %
NEUTROPHILS # BLD AUTO: 2.06 X10(3)/MCL (ref 2.1–9.2)
NEUTROPHILS NFR BLD AUTO: 42.6 %
PLATELET # BLD AUTO: 176 X10(3)/MCL (ref 130–400)
PMV BLD AUTO: 8.8 FL (ref 7.4–10.4)
POTASSIUM SERPL-SCNC: 4.7 MMOL/L (ref 3.5–5.1)
PROT SERPL-MCNC: 6.3 GM/DL (ref 5.8–7.6)
PSA FREE MFR SERPL: NORMAL %
PSA FREE SERPL-MCNC: <0.1 NG/ML
PSA SERPL-MCNC: <0.1 NG/ML
RBC # BLD AUTO: 4.12 X10(6)/MCL (ref 4.7–6.1)
SODIUM SERPL-SCNC: 141 MMOL/L (ref 136–145)
WBC # SPEC AUTO: 4.8 X10(3)/MCL (ref 4.5–11.5)

## 2023-03-10 PROCEDURE — 85025 COMPLETE CBC W/AUTO DIFF WBC: CPT

## 2023-03-10 PROCEDURE — 84153 ASSAY OF PSA TOTAL: CPT

## 2023-03-10 PROCEDURE — 36415 COLL VENOUS BLD VENIPUNCTURE: CPT

## 2023-03-10 PROCEDURE — 80053 COMPREHEN METABOLIC PANEL: CPT

## 2023-03-14 ENCOUNTER — OFFICE VISIT (OUTPATIENT)
Dept: HEMATOLOGY/ONCOLOGY | Facility: CLINIC | Age: 63
End: 2023-03-14
Payer: COMMERCIAL

## 2023-03-14 VITALS
TEMPERATURE: 98 F | OXYGEN SATURATION: 99 % | WEIGHT: 235.63 LBS | BODY MASS INDEX: 31.91 KG/M2 | SYSTOLIC BLOOD PRESSURE: 128 MMHG | RESPIRATION RATE: 18 BRPM | HEART RATE: 75 BPM | DIASTOLIC BLOOD PRESSURE: 85 MMHG | HEIGHT: 72 IN

## 2023-03-14 DIAGNOSIS — C79.51 MALIGNANT NEOPLASM METASTATIC TO BONE: Primary | ICD-10-CM

## 2023-03-14 DIAGNOSIS — C61 PROSTATE CA: ICD-10-CM

## 2023-03-14 PROCEDURE — 99999 PR PBB SHADOW E&M-EST. PATIENT-LVL IV: ICD-10-PCS | Mod: PBBFAC,,, | Performed by: INTERNAL MEDICINE

## 2023-03-14 PROCEDURE — 99214 OFFICE O/P EST MOD 30 MIN: CPT | Mod: S$GLB,,, | Performed by: INTERNAL MEDICINE

## 2023-03-14 PROCEDURE — 99214 PR OFFICE/OUTPT VISIT, EST, LEVL IV, 30-39 MIN: ICD-10-PCS | Mod: S$GLB,,, | Performed by: INTERNAL MEDICINE

## 2023-03-14 PROCEDURE — 99999 PR PBB SHADOW E&M-EST. PATIENT-LVL IV: CPT | Mod: PBBFAC,,, | Performed by: INTERNAL MEDICINE

## 2023-03-14 PROCEDURE — 99214 OFFICE O/P EST MOD 30 MIN: CPT | Mod: PBBFAC | Performed by: INTERNAL MEDICINE

## 2023-03-14 NOTE — PROGRESS NOTES
Subjective:       Patient ID: Manjinder Dong is a 62 y.o. male.    Chief Complaint: Prostate Cancer (Pt reports no new issues or concerns. )        Diagnosis:  1.  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  2.  [place] 7 hormone sensitive metastatic prostate cancer with bone metastases diagnosed in May, 2015.  Previously followed by Dr. Heaton  3.  Bone pain secondary to #1.  well controlled.   4.  Hot flashes-- stable   5.  Chronic joint pain secondary to effects of Lupron and Xtandi    Current Treatment:   1.  Xtandi 160 mg po daily (written for 12/11/2018)  start date 12/2018  2.  Lupron every 6 months with Dr. Adams  3.  Xgeva 120 mg subcu monthly.  Changed to every 3 months  September 6, 2017.  Currently on hold due to gum issues     Treatment History:  1.  Taxotere ×6 cycles completed in 2016.  Exact dates uncertain based on available records from [facility]  2.  Lupron q 6 months started May 2015.    3.  Casodex stopped 11/2017  4.  Xofigo Q4W x 6 treatments  +  palliative radiation to back/hips-- completed #6 of 6   9/14/18  5.  Short course of XRT per Rad/Onc based on STAMPEDE study, plan for 20 fractions----started July 2020.    HPI:  Mr. Dong is a very pleasant 62-year-old gentleman kindly referred by Dr. Adams for second opinion for known history of metastatic prostate cancer.  The patient's history is remarkable for rising PSA back in 2015 initially discovered to be 27 in January 2015, and subsequently rising to 44 by May 2015 which prompted the biopsy.  Biopsy revealed [place] 7 T4 being prostate cancer.  Scans done at that time included bone scan and CTs which revealed bone lesions throughout the thoracic and lumbar vertebral bodies along with the ribs, additional lesions in the pelvis, as well as enlarged left internal and external iliac metastatic  nodes.  The patient reportedly underwent radiation to the prostate although I do not have records to definitively confirm this.  He was started on Lupron as well as given 6 cycles of Taxotere with Dr. Heaton which was completed in 2016.   He seemed to tolerate treatment well and had significant decline in his PSA going down to 0.11 as of December 2015 and reportedly staying close to that value in the ensuing several months.  He was receiving every 6 month Lupron under the care of Dr. Adams and was also on Casodex.  He also received monthly Xgeva under the care of Dr. Heaton.  However, for various reasons he requested to have his oncologic care changed and was therefore referred to [facility]  He underwent MRI of the brain in April 2016 which showed no significant pathology.  Otherwise prior CT and bone scans were done in November 2015     Restaging CT and bone scans performed March 1, 2018 shows several sclerotic lesions in the thoracic spine, left scapula, right ribs. Most of the lesions that can be compared to the prior CT scan of the abdomen and pelvis performed at [facility] in 2015 show relative stability. Corresponding bone scan done March 1, 2018 shows increased uptake at T7. Other areas noted on both bone scan that correspond to the CT scan, reveal sclerotic areas consistent with possible treated/healed metastases.  PSA on the same day was 0.52 .  MRI of the spine done 3/20/2018 noted sclerotic metastases to left L1 pedicle and iliac bones bilaterally.  Multilevel disc disease is present- worst at L4-5 where left paracentral and left foraminal disc extrusion is present causing suspected impingment of the left L4/L5 nerve roots. Focal bone marrow lesions in T1, T7 and L1 are consistent with metastases.  Patient then underwent T4/T5 spinal fusion surgery by Dr. Turner April 26, 2018, along with kyphoplasty of one vertebral body.  Patient then treated with Xofigo in August and  September of 2018.         Restaging CT and bone scans ordered by Dr. Lane on 12/4/2018 showed stable osseous mets of the right acetabulum, b/l iliac bones, L1 vertebra, T-spine.  No soft tissue disease.  Patient was then started on Xtandi 160 mg/day during his visit on 12/11/2018.  PSAs showed good response.  Bilateral diagnostic MMG on 3/3/2020 ordered due to pain and gynecomastia showed no mammographic evidence of malignancy; bilateral gynecomastia is benign and read as BIRADS 2.  PSA monitoring continued and has remained stable, most recent level <0.02 on 3/9/2021.     CT scan of the chest, abdomen, and pelvis on 7/9/2021 showed stable osseous metastatic disease with no evidence of disease in the chest, abdomen, and pelvis.  Bone scan showed uptake in the right femoral proximal diaphyseal focal uptake concerning for metastatic lesion.     Called by patient's wife on 7/15/2021, the day after I saw him.  While shopping in [place], he stumbled and had a right femoral fracture and underwent surgery in [place] by Dr. Forman.  Biopsy taken.  Of note, I offered to have patient get MRI on 7/14/2021, but he requested that we do it next week.  He will likely need radiation.      Right femur fracture on 7/15/2021, underwent claudette placement for stabilization @ [facility] in [place], pathology showed fragments of skeletal muscle and bone with features consistent with fracture.  No evidence of metastatic carcinoma.    PSMA PET/CT scan done on 2/6/2023 showed an area of activity in the prostate, no evidence of bone are non osseous metastatic disease.      Interval History:   Patient is here today for follow-up for prostate cancer. He remains on Xtandi and Lupron and continues to tolerate them fairly well.         Past Medical History:   Diagnosis Date    Arthritis     COPD (chronic obstructive pulmonary disease)     HTN (hypertension)     Malignant neoplasm metastatic to bone     Prostate CA        Past Surgical History:   Procedure Laterality Date    BACK SURGERY N/A     Lower back in     FRACTURE SURGERY      INTRAMEDULLARY RODDING OF FEMUR Right 07/15/2001    PROSTATE BIOPSY N/A     SPINAL FUSION N/A     T4/T5 in 2018     Social History     Socioeconomic History    Marital status:    Tobacco Use    Smoking status: Former     Packs/day: 1.50     Years: 35.00     Pack years: 52.50     Types: Cigarettes     Quit date: 2015     Years since quittin.2    Smokeless tobacco: Never   Substance and Sexual Activity    Alcohol use: Yes    Drug use: Never      Family History   Family history unknown: Yes      Review of patient's allergies indicates:  No Known Allergies   Review of Systems   Constitutional:  Negative for activity change, appetite change, chills, diaphoresis, fatigue, fever and unexpected weight change.   HENT:  Negative for mouth sores.    Eyes:  Negative for visual disturbance.   Respiratory:  Negative for cough and shortness of breath.    Cardiovascular:  Negative for chest pain, palpitations and leg swelling.   Gastrointestinal:  Negative for abdominal pain, blood in stool, change in bowel habit, constipation, diarrhea, nausea, vomiting and change in bowel habit.   Endocrine: Negative for cold intolerance.   Genitourinary:  Negative for dysuria, frequency and hematuria.   Musculoskeletal:  Positive for arthralgias. Negative for back pain.   Integumentary:  Negative for rash.   Neurological:  Negative for dizziness, weakness, light-headedness, numbness and headaches.   Hematological:  Negative for adenopathy. Does not bruise/bleed easily.   Psychiatric/Behavioral:  Negative for confusion. The patient is not nervous/anxious.        Objective:      Physical Exam  Vitals reviewed.   Constitutional:       General: He is not in acute distress.     Appearance: Normal appearance.   HENT:      Head: Normocephalic and atraumatic.   Eyes:      General: Lids are normal. No scleral  icterus.     Extraocular Movements: Extraocular movements intact.      Conjunctiva/sclera: Conjunctivae normal.   Cardiovascular:      Rate and Rhythm: Normal rate and regular rhythm.      Heart sounds: Normal heart sounds.   Pulmonary:      Effort: Pulmonary effort is normal.      Breath sounds: Normal breath sounds.   Abdominal:      General: Bowel sounds are normal. There is no distension.      Palpations: Abdomen is soft.      Tenderness: There is no abdominal tenderness. There is no guarding.   Musculoskeletal:         General: No swelling, tenderness or deformity.      Cervical back: Neck supple. No tenderness. No muscular tenderness.      Right lower leg: No edema.      Left lower leg: No edema.      Comments: Brace to the left lower extremity   Lymphadenopathy:      Cervical: No cervical adenopathy.      Upper Body:      Right upper body: No supraclavicular or axillary adenopathy.      Left upper body: No supraclavicular or axillary adenopathy.   Skin:     General: Skin is warm and dry.      Coloration: Skin is not jaundiced or pale.      Findings: No rash.   Neurological:      General: No focal deficit present.      Mental Status: He is alert and oriented to person, place, and time.      Motor: Motor function is intact. No weakness.      Gait: Gait is intact.   Psychiatric:         Mood and Affect: Mood normal.         Speech: Speech normal.         Behavior: Behavior normal. Behavior is cooperative.     LABS REVIEWED IN [facility]        Assessment:     1.  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  2.  [place] 7 hormone sensitive metastatic prostate cancer with bone metastases diagnosed in May, 2015.  Previously followed by Dr. Heaton  3.  Bone pain secondary to #1.  well controlled.   4.  Hot flashes-- stable   5.  Chronic joint pain secondary to effects of Lupron and Xtandi         Plan:       At this time, the patient will continue on his Xtandi.      PSA stable     Continue to HOLD Xgeva due to exposed bone on the right side of his jaw-- follow up with oral surgery for further recommendations      He will continue Lupron every 6 months with Dr. Adams     Return to clinic in 3 months for OV and  clinical exam     He sees Dr. Nunn tomorrow, we will discuss possible radiation for his new prostate lesion.     Labs: CBC, CMP, and PSA     He knows to call with any new/worsening symptoms         Jorge Flores II, MD I, Roslyn Ayala LPN, acted solely as a scribe for and in the presence of, Dr. Jorge Flores who performed the service.

## 2023-05-12 DIAGNOSIS — C61 PROSTATE CA: Primary | ICD-10-CM

## 2023-05-12 DIAGNOSIS — C79.51 MALIGNANT NEOPLASM METASTATIC TO BONE: ICD-10-CM

## 2023-05-12 RX ORDER — ENZALUTAMIDE 80 MG/1
2 TABLET ORAL DAILY
Qty: 60 TABLET | Refills: 2 | Status: SHIPPED | OUTPATIENT
Start: 2023-05-12 | End: 2023-08-23

## 2023-06-14 NOTE — PROGRESS NOTES
Subjective:       Patient ID: Manjinder Dong is a 62 y.o. male.    Chief Complaint: Other Misc (Pt reports shingles on his head.)        Diagnosis:  1.  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  2.  Bailey 7 hormone sensitive metastatic prostate cancer with bone metastases diagnosed in May, 2015.  Previously followed by Dr. Heaton  3.  Bone pain secondary to #1.  well controlled.   4.  Hot flashes-- stable   5.  Chronic joint pain secondary to effects of Lupron and Xtandi    Current Treatment:   1.  Xtandi 160 mg po daily (written for 12/11/2018)  start date 12/2018  2.  Lupron every 6 months with Dr. Adams  3.  Xgeva 120 mg subcu monthly.  Changed to every 3 months  September 6, 2017.  Currently on hold due to gum issues     Treatment History:  1.  Taxotere ×6 cycles completed in 2016.  Exact dates uncertain based on available records from [facility]  2.  Lupron q 6 months started May 2015.    3.  Casodex stopped 11/2017  4.  Xofigo Q4W x 6 treatments  +  palliative radiation to back/hips-- completed #6 of 6   9/14/18  5.  Short course of XRT per Rad/Onc based on STAMPEDE study, plan for 20 fractions----started July 2020.    HPI:  Mr. Dong is a very pleasant 62-year-old gentleman kindly referred by Dr. Adams for second opinion for known history of metastatic prostate cancer.  The patient's history is remarkable for rising PSA back in 2015 initially discovered to be 27 in January 2015, and subsequently rising to 44 by May 2015 which prompted the biopsy.  Biopsy revealed Laporte 7 T4 being prostate cancer.  Scans done at that time included bone scan and CTs which revealed bone lesions throughout the thoracic and lumbar vertebral bodies along with the ribs, additional lesions in the pelvis, as well as enlarged left internal and external iliac metastatic nodes.  The  patient reportedly underwent radiation to the prostate although I do not have records to definitively confirm this.  He was started on Lupron as well as given 6 cycles of Taxotere with Dr. Heaton which was completed in 2016.   He seemed to tolerate treatment well and had significant decline in his PSA going down to 0.11 as of December 2015 and reportedly staying close to that value in the ensuing several months.  He was receiving every 6 month Lupron under the care of Dr. Adams and was also on Casodex.  He also received monthly Xgeva under the care of Dr. Heaton.  However, for various reasons he requested to have his oncologic care changed and was therefore referred to [facility].  He underwent MRI of the brain in April 2016 which showed no significant pathology.  Otherwise prior CT and bone scans were done in November 2015     Restaging CT and bone scans performed March 1, 2018 shows several sclerotic lesions in the thoracic spine, left scapula, right ribs. Most of the lesions that can be compared to the prior CT scan of the abdomen and pelvis performed at our [facility] in 2015 show relative stability. Corresponding bone scan done March 1, 2018 shows increased uptake at T7. Other areas noted on both bone scan that correspond to the CT scan, reveal sclerotic areas consistent with possible treated/healed metastases.  PSA on the same day was 0.52 .  MRI of the spine done 3/20/2018 noted sclerotic metastases to left L1 pedicle and iliac bones bilaterally.  Multilevel disc disease is present- worst at L4-5 where left paracentral and left foraminal disc extrusion is present causing suspected impingment of the left L4/L5 nerve roots. Focal bone marrow lesions in T1, T7 and L1 are consistent with metastases.  Patient then underwent T4/T5 spinal fusion surgery by Dr. Turner April 26, 2018, along with kyphoplasty of one vertebral body.  Patient then treated with Xofigo in August and September of  2018.         Restaging CT and bone scans ordered by Dr. Lane on 12/4/2018 showed stable osseous mets of the right acetabulum, b/l iliac bones, L1 vertebra, T-spine.  No soft tissue disease.  Patient was then started on Xtandi 160 mg/day during his visit on 12/11/2018.  PSAs showed good response.  Bilateral diagnostic MMG on 3/3/2020 ordered due to pain and gynecomastia showed no mammographic evidence of malignancy; bilateral gynecomastia is benign and read as BIRADS 2.  PSA monitoring continued and has remained stable, most recent level <0.02 on 3/9/2021.     CT scan of the chest, abdomen, and pelvis on 7/9/2021 showed stable osseous metastatic disease with no evidence of disease in the chest, abdomen, and pelvis.  Bone scan showed uptake in the right femoral proximal diaphyseal focal uptake concerning for metastatic lesion.     Called by patient's wife on 7/15/2021, the day after I saw him.  While shopping in [place], he stumbled and had a right femoral fracture and underwent surgery in [place] by Dr. Forman.  Biopsy taken.  Of note, I offered to have patient get MRI on 7/14/2021, but he requested that we do it next week.  He will likely need radiation.      Right femur fracture on 7/15/2021, underwent claudette placement for stabilization @ [facility] in [place], pathology showed fragments of skeletal muscle and bone with features consistent with fracture.  No evidence of metastatic carcinoma.    PSMA PET/CT scan done on 2/6/2023 showed an area of activity in the prostate, no evidence of bone are non osseous metastatic disease.  Patient treated by Dr. Nunn.      Interval History:   Patient is here today for follow-up for prostate cancer. He remains on Xtandi and Lupron and continues to tolerate them fairly well.  He does have shingles currently on the left side of his forehead and also involving his left eyelid.  He will be seeing Ophthalmology today.        Past Medical History:    Diagnosis Date    Arthritis     COPD (chronic obstructive pulmonary disease)     HTN (hypertension)     Malignant neoplasm metastatic to bone     Prostate CA       Past Surgical History:   Procedure Laterality Date    BACK SURGERY N/A     Lower back in     FRACTURE SURGERY      INTRAMEDULLARY RODDING OF FEMUR Right 07/15/2001    PROSTATE BIOPSY N/A     SPINAL FUSION N/A     T4/T5 in 2018     Social History     Socioeconomic History    Marital status:    Tobacco Use    Smoking status: Former     Packs/day: 1.50     Years: 35.00     Pack years: 52.50     Types: Cigarettes     Quit date: 2015     Years since quittin.4    Smokeless tobacco: Never   Substance and Sexual Activity    Alcohol use: Yes    Drug use: Never      Family History   Family history unknown: Yes      Review of patient's allergies indicates:  No Known Allergies   Review of Systems   Constitutional:  Negative for appetite change and unexpected weight change.   HENT:  Negative for mouth sores.    Eyes:  Negative for visual disturbance.   Respiratory:  Negative for cough and shortness of breath.    Cardiovascular:  Negative for chest pain.   Gastrointestinal:  Negative for abdominal pain and diarrhea.   Genitourinary:  Negative for frequency.   Musculoskeletal:  Negative for back pain.   Integumentary:  Negative for rash.   Neurological:  Negative for headaches.   Hematological:  Negative for adenopathy.   Psychiatric/Behavioral:  The patient is not nervous/anxious.        Objective:      Physical Exam  Vitals reviewed.   Constitutional:       General: He is not in acute distress.     Appearance: Normal appearance.   HENT:      Head: Normocephalic and atraumatic.   Eyes:      General: Lids are normal. No scleral icterus.     Extraocular Movements: Extraocular movements intact.      Conjunctiva/sclera: Conjunctivae normal.   Cardiovascular:      Rate and Rhythm: Normal rate and regular rhythm.      Heart sounds: Normal heart  sounds.   Pulmonary:      Effort: Pulmonary effort is normal.      Breath sounds: Normal breath sounds.   Abdominal:      General: Bowel sounds are normal. There is no distension.      Palpations: Abdomen is soft.      Tenderness: There is no abdominal tenderness. There is no guarding.   Musculoskeletal:         General: No swelling, tenderness or deformity.      Cervical back: Neck supple. No tenderness. No muscular tenderness.      Right lower leg: No edema.      Left lower leg: No edema.      Comments: Brace to the left lower extremity   Lymphadenopathy:      Cervical: No cervical adenopathy.      Upper Body:      Right upper body: No supraclavicular or axillary adenopathy.      Left upper body: No supraclavicular or axillary adenopathy.   Skin:     General: Skin is warm and dry.      Coloration: Skin is not jaundiced or pale.      Findings: No rash.      Comments: Findings consistent with shingles on the left forehead and left side of his face including the eyelid   Neurological:      General: No focal deficit present.      Mental Status: He is alert and oriented to person, place, and time.      Motor: Motor function is intact. No weakness.      Gait: Gait is intact.   Psychiatric:         Mood and Affect: Mood normal.         Speech: Speech normal.         Behavior: Behavior normal. Behavior is cooperative.     LABS REVIEWED IN [facility]        Assessment:     1.  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  2.  Bailey 7 hormone sensitive metastatic prostate cancer with bone metastases diagnosed in May, 2015.  Previously followed by Dr. Heaton  3.  Bone pain secondary to #1.  well controlled.   4.  Hot flashes-- stable   5.  Chronic joint pain secondary to effects of Lupron and Xtandi        Plan:       At this time, the patient will continue on his Xtandi.      PSA stable     Continue  to HOLD Xgeva due to exposed bone on the right side of his jaw-- follow up with oral surgery for further recommendations      He will continue Lupron every 6 months with Dr. Adams     Return to clinic in 3 months for OV and  clinical exam     Labs: CBC, CMP, and PSA    He will see Ophthalmology today due to eyelid involvement of shingles.     He knows to call with any new/worsening symptoms         Jorge Flores II, MD

## 2023-06-19 ENCOUNTER — TELEPHONE (OUTPATIENT)
Dept: HEMATOLOGY/ONCOLOGY | Facility: CLINIC | Age: 63
End: 2023-06-19
Payer: COMMERCIAL

## 2023-06-19 NOTE — TELEPHONE ENCOUNTER
Patient advised and voiced understanding.   SBAR form completed and placed on chart. Chart with patient in transit.

## 2023-06-19 NOTE — TELEPHONE ENCOUNTER
Patient has 3 month f/u tomorrow. Was diagnosed with shingles on Friday and has been taking anti-virals. The sores are drying up and are on the left side of his scalp. States he keeps it covered when going out. He does have one spot near his left eyelid that he states is just red, but no sore there. Would it be okay for him to keep appointment?    *I did instruct him to contact ophthalmologist if area near his eye worsens.

## 2023-06-20 ENCOUNTER — OFFICE VISIT (OUTPATIENT)
Dept: HEMATOLOGY/ONCOLOGY | Facility: CLINIC | Age: 63
End: 2023-06-20
Payer: COMMERCIAL

## 2023-06-20 VITALS
BODY MASS INDEX: 31.02 KG/M2 | OXYGEN SATURATION: 99 % | TEMPERATURE: 98 F | RESPIRATION RATE: 14 BRPM | DIASTOLIC BLOOD PRESSURE: 80 MMHG | HEART RATE: 78 BPM | HEIGHT: 72 IN | SYSTOLIC BLOOD PRESSURE: 130 MMHG | WEIGHT: 229 LBS

## 2023-06-20 DIAGNOSIS — C61 PROSTATE CA: Primary | ICD-10-CM

## 2023-06-20 DIAGNOSIS — C79.51 MALIGNANT NEOPLASM METASTATIC TO BONE: ICD-10-CM

## 2023-06-20 PROCEDURE — 99999 PR PBB SHADOW E&M-EST. PATIENT-LVL V: CPT | Mod: PBBFAC,,, | Performed by: INTERNAL MEDICINE

## 2023-06-20 PROCEDURE — 99999 PR PBB SHADOW E&M-EST. PATIENT-LVL V: ICD-10-PCS | Mod: PBBFAC,,, | Performed by: INTERNAL MEDICINE

## 2023-06-20 PROCEDURE — 99215 OFFICE O/P EST HI 40 MIN: CPT | Mod: PBBFAC | Performed by: INTERNAL MEDICINE

## 2023-06-20 PROCEDURE — 99214 PR OFFICE/OUTPT VISIT, EST, LEVL IV, 30-39 MIN: ICD-10-PCS | Mod: S$GLB,,, | Performed by: INTERNAL MEDICINE

## 2023-06-20 PROCEDURE — 99214 OFFICE O/P EST MOD 30 MIN: CPT | Mod: S$GLB,,, | Performed by: INTERNAL MEDICINE

## 2023-06-20 RX ORDER — NAPROXEN SODIUM 220 MG
220 TABLET ORAL
COMMUNITY

## 2023-06-20 RX ORDER — TRIAMCINOLONE ACETONIDE 5 MG/G
1 CREAM TOPICAL 2 TIMES DAILY
COMMUNITY
Start: 2023-06-16

## 2023-06-20 RX ORDER — VALACYCLOVIR HYDROCHLORIDE 1 G/1
1000 TABLET, FILM COATED ORAL EVERY 8 HOURS
COMMUNITY
Start: 2023-06-16

## 2023-06-20 RX ORDER — PREDNISOLONE ACETATE 10 MG/ML
SUSPENSION/ DROPS OPHTHALMIC
COMMUNITY
Start: 2023-06-16

## 2023-08-17 DIAGNOSIS — C79.51 MALIGNANT NEOPLASM METASTATIC TO BONE: ICD-10-CM

## 2023-08-17 DIAGNOSIS — C61 PROSTATE CA: ICD-10-CM

## 2023-08-23 RX ORDER — ENZALUTAMIDE 80 MG/1
2 TABLET ORAL DAILY
Qty: 60 TABLET | Refills: 2 | Status: SHIPPED | OUTPATIENT
Start: 2023-08-23 | End: 2024-01-02 | Stop reason: SDUPTHER

## 2023-09-14 ENCOUNTER — LAB VISIT (OUTPATIENT)
Dept: LAB | Facility: HOSPITAL | Age: 63
End: 2023-09-14
Attending: INTERNAL MEDICINE
Payer: COMMERCIAL

## 2023-09-14 DIAGNOSIS — C79.51 MALIGNANT NEOPLASM METASTATIC TO BONE: ICD-10-CM

## 2023-09-14 DIAGNOSIS — C61 PROSTATE CA: ICD-10-CM

## 2023-09-14 LAB
ALBUMIN SERPL-MCNC: 4 G/DL (ref 3.4–4.8)
ALBUMIN/GLOB SERPL: 1.7 RATIO (ref 1.1–2)
ALP SERPL-CCNC: 45 UNIT/L (ref 40–150)
ALT SERPL-CCNC: 23 UNIT/L (ref 0–55)
AST SERPL-CCNC: 21 UNIT/L (ref 5–34)
BASOPHILS # BLD AUTO: 0.04 X10(3)/MCL
BASOPHILS NFR BLD AUTO: 0.7 %
BILIRUB SERPL-MCNC: 0.5 MG/DL
BUN SERPL-MCNC: 17.9 MG/DL (ref 8.4–25.7)
CALCIUM SERPL-MCNC: 9.7 MG/DL (ref 8.8–10)
CHLORIDE SERPL-SCNC: 107 MMOL/L (ref 98–107)
CO2 SERPL-SCNC: 23 MMOL/L (ref 23–31)
CREAT SERPL-MCNC: 0.98 MG/DL (ref 0.73–1.18)
EOSINOPHIL # BLD AUTO: 0.35 X10(3)/MCL (ref 0–0.9)
EOSINOPHIL NFR BLD AUTO: 6.4 %
ERYTHROCYTE [DISTWIDTH] IN BLOOD BY AUTOMATED COUNT: 13 % (ref 11.5–17)
GFR SERPLBLD CREATININE-BSD FMLA CKD-EPI: >60 MLS/MIN/1.73/M2
GLOBULIN SER-MCNC: 2.4 GM/DL (ref 2.4–3.5)
GLUCOSE SERPL-MCNC: 99 MG/DL (ref 82–115)
HCT VFR BLD AUTO: 39.3 % (ref 42–52)
HGB BLD-MCNC: 13.1 G/DL (ref 14–18)
IMM GRANULOCYTES # BLD AUTO: 0.02 X10(3)/MCL (ref 0–0.04)
IMM GRANULOCYTES NFR BLD AUTO: 0.4 %
LYMPHOCYTES # BLD AUTO: 2.23 X10(3)/MCL (ref 0.6–4.6)
LYMPHOCYTES NFR BLD AUTO: 41 %
MCH RBC QN AUTO: 31.7 PG (ref 27–31)
MCHC RBC AUTO-ENTMCNC: 33.3 G/DL (ref 33–36)
MCV RBC AUTO: 95.2 FL (ref 80–94)
MONOCYTES # BLD AUTO: 0.45 X10(3)/MCL (ref 0.1–1.3)
MONOCYTES NFR BLD AUTO: 8.3 %
NEUTROPHILS # BLD AUTO: 2.35 X10(3)/MCL (ref 2.1–9.2)
NEUTROPHILS NFR BLD AUTO: 43.2 %
PLATELET # BLD AUTO: 173 X10(3)/MCL (ref 130–400)
PMV BLD AUTO: 9.1 FL (ref 7.4–10.4)
POTASSIUM SERPL-SCNC: 4.2 MMOL/L (ref 3.5–5.1)
PROT SERPL-MCNC: 6.4 GM/DL (ref 5.8–7.6)
PSA SERPL-MCNC: <0.1 NG/ML
RBC # BLD AUTO: 4.13 X10(6)/MCL (ref 4.7–6.1)
SODIUM SERPL-SCNC: 142 MMOL/L (ref 136–145)
TESTOST SERPL-MCNC: 34.22 NG/DL (ref 220.91–715.81)
WBC # SPEC AUTO: 5.44 X10(3)/MCL (ref 4.5–11.5)

## 2023-09-14 PROCEDURE — 36415 COLL VENOUS BLD VENIPUNCTURE: CPT

## 2023-09-14 PROCEDURE — 80053 COMPREHEN METABOLIC PANEL: CPT

## 2023-09-14 PROCEDURE — 84153 ASSAY OF PSA TOTAL: CPT

## 2023-09-14 PROCEDURE — 85025 COMPLETE CBC W/AUTO DIFF WBC: CPT

## 2023-09-14 PROCEDURE — 84403 ASSAY OF TOTAL TESTOSTERONE: CPT

## 2023-09-19 ENCOUNTER — OFFICE VISIT (OUTPATIENT)
Dept: HEMATOLOGY/ONCOLOGY | Facility: CLINIC | Age: 63
End: 2023-09-19
Payer: COMMERCIAL

## 2023-09-19 VITALS
RESPIRATION RATE: 18 BRPM | HEIGHT: 69 IN | BODY MASS INDEX: 35.2 KG/M2 | HEART RATE: 84 BPM | SYSTOLIC BLOOD PRESSURE: 111 MMHG | DIASTOLIC BLOOD PRESSURE: 78 MMHG | OXYGEN SATURATION: 97 % | WEIGHT: 237.63 LBS | TEMPERATURE: 98 F

## 2023-09-19 DIAGNOSIS — C61 PROSTATE CA: Primary | ICD-10-CM

## 2023-09-19 DIAGNOSIS — C79.51 MALIGNANT NEOPLASM METASTATIC TO BONE: ICD-10-CM

## 2023-09-19 DIAGNOSIS — Z79.818 ENCOUNTER FOR MONITORING ANDROGEN DEPRIVATION THERAPY: ICD-10-CM

## 2023-09-19 PROCEDURE — 99214 OFFICE O/P EST MOD 30 MIN: CPT | Mod: S$GLB,,, | Performed by: NURSE PRACTITIONER

## 2023-09-19 PROCEDURE — 99999 PR PBB SHADOW E&M-EST. PATIENT-LVL V: CPT | Mod: PBBFAC,,, | Performed by: NURSE PRACTITIONER

## 2023-09-19 PROCEDURE — 99999 PR PBB SHADOW E&M-EST. PATIENT-LVL V: ICD-10-PCS | Mod: PBBFAC,,, | Performed by: NURSE PRACTITIONER

## 2023-09-19 PROCEDURE — 99215 OFFICE O/P EST HI 40 MIN: CPT | Mod: PBBFAC | Performed by: NURSE PRACTITIONER

## 2023-09-19 PROCEDURE — 99214 PR OFFICE/OUTPT VISIT, EST, LEVL IV, 30-39 MIN: ICD-10-PCS | Mod: S$GLB,,, | Performed by: NURSE PRACTITIONER

## 2023-09-19 NOTE — PROGRESS NOTES
Subjective:       Patient ID: Manjinder Dong is a 63 y.o. male.    Chief Complaint: Follow-up (No concerns today)        Diagnosis:  1.  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  2.  Bailey 7 hormone sensitive metastatic prostate cancer with bone metastases diagnosed in May, 2015.  Previously followed by Dr. Heaton  3.  Bone pain secondary to #1.  well controlled.   4.  Hot flashes-- stable   5.  Chronic joint pain secondary to effects of Lupron and Xtandi    Current Treatment:   1.  Xtandi 160 mg po daily (written for 12/11/2018)  start date 12/2018  2.  Lupron every 6 months with Dr. Adams  3.  Xgeva 120 mg subcu monthly.  Changed to every 3 months  September 6, 2017.  Currently on hold due to gum issues     Treatment History:  1.  Taxotere ×6 cycles completed in 2016.  Exact dates uncertain based on available records from [facility]  2.  Lupron q 6 months started May 2015.    3.  Casodex stopped 11/2017  4.  Xofigo Q4W x 6 treatments  +  palliative radiation to back/hips-- completed #6 of 6   9/14/18  5.  Short course of XRT per Rad/Onc based on STAMPEDE study, plan for 20 fractions----started July 2020.    HPI:  Mr. Dong is a very pleasant 63-year-old gentleman kindly referred by Dr. Adams for second opinion for known history of metastatic prostate cancer.  The patient's history is remarkable for rising PSA back in 2015 initially discovered to be 27 in January 2015, and subsequently rising to 44 by May 2015 which prompted the biopsy.  Biopsy revealed Bailey 7 T4 being prostate cancer.  Scans done at that time included bone scan and CTs which revealed bone lesions throughout the thoracic and lumbar vertebral bodies along with the ribs, additional lesions in the pelvis, as well as enlarged left internal and external iliac metastatic nodes.  The patient reportedly  underwent radiation to the prostate although I do not have records to definitively confirm this.  He was started on Lupron as well as given 6 cycles of Taxotere with Dr. Heaton which was completed in 2016.   He seemed to tolerate treatment well and had significant decline in his PSA going down to 0.11 as of December 2015 and reportedly staying close to that value in the ensuing several months.  He was receiving every 6 month Lupron under the care of Dr. Adams and was also on Casodex.  He also received monthly Xgeva under the care of Dr. Heaton.  However, for various reasons he requested to have his oncologic care changed and was therefore referred to [facility].  He underwent MRI of the brain in April 2016 which showed no significant pathology.  Otherwise prior CT and bone scans were done in November 2015     Restaging CT and bone scans performed March 1, 2018 shows several sclerotic lesions in the thoracic spine, left scapula, right ribs. Most of the lesions that can be compared to the prior CT scan of the abdomen and pelvis performed at [facility] in 2015 show relative stability. Corresponding bone scan done March 1, 2018 shows increased uptake at T7. Other areas noted on both bone scan that correspond to the CT scan, reveal sclerotic areas consistent with possible treated/healed metastases.  PSA on the same day was 0.52 .  MRI of the spine done 3/20/2018 noted sclerotic metastases to left L1 pedicle and iliac bones bilaterally.  Multilevel disc disease is present- worst at L4-5 where left paracentral and left foraminal disc extrusion is present causing suspected impingment of the left L4/L5 nerve roots. Focal bone marrow lesions in T1, T7 and L1 are consistent with metastases.  Patient then underwent T4/T5 spinal fusion surgery by Dr. Turner April 26, 2018, along with kyphoplasty of one vertebral body.  Patient then treated with Xofigo in August and September of 2018.          Restaging CT and bone scans ordered by Dr. Lane on 12/4/2018 showed stable osseous mets of the right acetabulum, b/l iliac bones, L1 vertebra, T-spine.  No soft tissue disease.  Patient was then started on Xtandi 160 mg/day during his visit on 12/11/2018.  PSAs showed good response.  Bilateral diagnostic MMG on 3/3/2020 ordered due to pain and gynecomastia showed no mammographic evidence of malignancy; bilateral gynecomastia is benign and read as BIRADS 2.  PSA monitoring continued and has remained stable, most recent level <0.02 on 3/9/2021.     CT scan of the chest, abdomen, and pelvis on 7/9/2021 showed stable osseous metastatic disease with no evidence of disease in the chest, abdomen, and pelvis.  Bone scan showed uptake in the right femoral proximal diaphyseal focal uptake concerning for metastatic lesion.     Called by patient's wife on 7/15/2021, the day after I saw him.  While shopping in [place], he stumbled and had a right femoral fracture and underwent surgery in [place] by Dr. Fomran.  Biopsy taken.  Of note, I offered to have patient get MRI on 7/14/2021, but he requested that we do it next week.  He will likely need radiation.      Right femur fracture on 7/15/2021, underwent claudette placement for stabilization @ [facility] in [place], pathology showed fragments of skeletal muscle and bone with features consistent with fracture.  No evidence of metastatic carcinoma.    PSMA PET/CT scan done on 2/6/2023 showed an area of activity in the prostate, no evidence of bone are non osseous metastatic disease.  Patient treated by Dr. Nunn.      Interval History:   Patient is here today for follow-up for prostate cancer. He remains on Xtandi and Lupron and continues to tolerate them fairly well. He denies any new, persistent pain or other complaints.      Past Medical History:   Diagnosis Date    Arthritis     COPD (chronic obstructive pulmonary disease)     HTN (hypertension)      Malignant neoplasm metastatic to bone     Prostate CA       Past Surgical History:   Procedure Laterality Date    BACK SURGERY N/A     Lower back in     FRACTURE SURGERY      INTRAMEDULLARY RODDING OF FEMUR Right 07/15/2001    PROSTATE BIOPSY N/A     SPINAL FUSION N/A     T4/T5 in 2018     Social History     Socioeconomic History    Marital status:    Tobacco Use    Smoking status: Former     Current packs/day: 0.00     Average packs/day: 1.5 packs/day for 35.0 years (52.5 ttl pk-yrs)     Types: Cigarettes     Start date: 1980     Quit date: 2015     Years since quittin.7    Smokeless tobacco: Never   Substance and Sexual Activity    Alcohol use: Yes    Drug use: Never      Family History   Family history unknown: Yes      Review of patient's allergies indicates:  No Known Allergies   Review of Systems   Constitutional:  Negative for appetite change and unexpected weight change.   HENT:  Negative for mouth sores.    Eyes:  Negative for visual disturbance.   Respiratory:  Positive for shortness of breath. Negative for cough.    Cardiovascular:  Negative for chest pain.   Gastrointestinal:  Negative for abdominal pain and diarrhea.   Genitourinary:  Negative for frequency.   Musculoskeletal:  Positive for back pain.   Integumentary:  Negative for rash.   Neurological:  Negative for headaches.   Hematological:  Negative for adenopathy.   Psychiatric/Behavioral:  The patient is not nervous/anxious.          Objective:      Physical Exam  Vitals reviewed.   Constitutional:       General: He is not in acute distress.     Appearance: Normal appearance.   HENT:      Head: Normocephalic and atraumatic.   Eyes:      General: Lids are normal. No scleral icterus.     Extraocular Movements: Extraocular movements intact.      Conjunctiva/sclera: Conjunctivae normal.   Cardiovascular:      Rate and Rhythm: Normal rate and regular rhythm.      Heart sounds: Normal heart sounds.   Pulmonary:      Effort:  Pulmonary effort is normal.      Breath sounds: Normal breath sounds.   Abdominal:      General: Bowel sounds are normal. There is no distension.      Palpations: Abdomen is soft.      Tenderness: There is no abdominal tenderness. There is no guarding.   Musculoskeletal:         General: No swelling, tenderness or deformity.      Cervical back: Neck supple. No tenderness. No muscular tenderness.      Right lower leg: No edema.      Left lower leg: No edema.      Comments: Brace to the left lower extremity   Lymphadenopathy:      Cervical: No cervical adenopathy.      Upper Body:      Right upper body: No supraclavicular or axillary adenopathy.      Left upper body: No supraclavicular or axillary adenopathy.   Skin:     General: Skin is warm and dry.      Coloration: Skin is not jaundiced or pale.      Findings: No rash.   Neurological:      General: No focal deficit present.      Mental Status: He is alert and oriented to person, place, and time.      Motor: Motor function is intact. No weakness.      Gait: Gait is intact.   Psychiatric:         Mood and Affect: Mood normal.         Speech: Speech normal.         Behavior: Behavior normal. Behavior is cooperative.       LABS REVIEWED IN [facility]        Assessment:     1.  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  2.  Bailey 7 hormone sensitive metastatic prostate cancer with bone metastases diagnosed in May, 2015.  Previously followed by Dr. Heaton  3.  Bone pain secondary to #1.  well controlled.   4.  Hot flashes-- stable   5.  Chronic joint pain secondary to effects of Lupron and Xtandi        Plan:       At this time, the patient will continue on his Xtandi.      PSA stable     Continue to HOLD Xgeva due to exposed bone on the right side of his jaw-- follow up with oral surgery for further recommendations      He will continue Lupron  every 6 months with Dr. Adams     Return to clinic in 3 months for OV and  clinical exam     Labs: CBC, CMP, and PSA     He knows to call with any new/worsening symptoms         Olya Hurt NP

## 2024-01-02 DIAGNOSIS — C79.51 MALIGNANT NEOPLASM METASTATIC TO BONE: ICD-10-CM

## 2024-01-02 DIAGNOSIS — C61 PROSTATE CA: Primary | ICD-10-CM

## 2024-01-02 RX ORDER — ENZALUTAMIDE 80 MG/1
2 TABLET ORAL DAILY
Qty: 60 TABLET | Refills: 3 | Status: SHIPPED | OUTPATIENT
Start: 2024-01-02

## 2024-01-04 ENCOUNTER — LAB VISIT (OUTPATIENT)
Dept: LAB | Facility: HOSPITAL | Age: 64
End: 2024-01-04
Attending: NURSE PRACTITIONER
Payer: MEDICARE

## 2024-01-04 DIAGNOSIS — Z79.818 ENCOUNTER FOR MONITORING ANDROGEN DEPRIVATION THERAPY: ICD-10-CM

## 2024-01-04 DIAGNOSIS — C79.51 MALIGNANT NEOPLASM METASTATIC TO BONE: ICD-10-CM

## 2024-01-04 DIAGNOSIS — C61 PROSTATE CA: ICD-10-CM

## 2024-01-04 LAB
ALBUMIN SERPL-MCNC: 4.3 G/DL (ref 3.4–4.8)
ALBUMIN/GLOB SERPL: 1.5 RATIO (ref 1.1–2)
ALP SERPL-CCNC: 54 UNIT/L (ref 40–150)
ALT SERPL-CCNC: 22 UNIT/L (ref 0–55)
AST SERPL-CCNC: 23 UNIT/L (ref 5–34)
BASOPHILS # BLD AUTO: 0.03 X10(3)/MCL
BASOPHILS NFR BLD AUTO: 0.5 %
BILIRUB SERPL-MCNC: 0.6 MG/DL
BUN SERPL-MCNC: 24.6 MG/DL (ref 8.4–25.7)
CALCIUM SERPL-MCNC: 10.1 MG/DL (ref 8.8–10)
CHLORIDE SERPL-SCNC: 105 MMOL/L (ref 98–107)
CO2 SERPL-SCNC: 29 MMOL/L (ref 23–31)
CREAT SERPL-MCNC: 1.12 MG/DL (ref 0.73–1.18)
EOSINOPHIL # BLD AUTO: 0.31 X10(3)/MCL (ref 0–0.9)
EOSINOPHIL NFR BLD AUTO: 4.7 %
ERYTHROCYTE [DISTWIDTH] IN BLOOD BY AUTOMATED COUNT: 13 % (ref 11.5–17)
GFR SERPLBLD CREATININE-BSD FMLA CKD-EPI: >60 MLS/MIN/1.73/M2
GLOBULIN SER-MCNC: 2.9 GM/DL (ref 2.4–3.5)
GLUCOSE SERPL-MCNC: 127 MG/DL (ref 82–115)
HCT VFR BLD AUTO: 42.3 % (ref 42–52)
HGB BLD-MCNC: 14.2 G/DL (ref 14–18)
IMM GRANULOCYTES # BLD AUTO: 0.01 X10(3)/MCL (ref 0–0.04)
IMM GRANULOCYTES NFR BLD AUTO: 0.2 %
LYMPHOCYTES # BLD AUTO: 2.41 X10(3)/MCL (ref 0.6–4.6)
LYMPHOCYTES NFR BLD AUTO: 36.3 %
MCH RBC QN AUTO: 31.5 PG (ref 27–31)
MCHC RBC AUTO-ENTMCNC: 33.6 G/DL (ref 33–36)
MCV RBC AUTO: 93.8 FL (ref 80–94)
MONOCYTES # BLD AUTO: 0.4 X10(3)/MCL (ref 0.1–1.3)
MONOCYTES NFR BLD AUTO: 6 %
NEUTROPHILS # BLD AUTO: 3.47 X10(3)/MCL (ref 2.1–9.2)
NEUTROPHILS NFR BLD AUTO: 52.3 %
PLATELET # BLD AUTO: 209 X10(3)/MCL (ref 130–400)
PMV BLD AUTO: 9.3 FL (ref 7.4–10.4)
POTASSIUM SERPL-SCNC: 4.9 MMOL/L (ref 3.5–5.1)
PROT SERPL-MCNC: 7.2 GM/DL (ref 5.8–7.6)
PSA SERPL-MCNC: <0.1 NG/ML
RBC # BLD AUTO: 4.51 X10(6)/MCL (ref 4.7–6.1)
SODIUM SERPL-SCNC: 139 MMOL/L (ref 136–145)
WBC # SPEC AUTO: 6.63 X10(3)/MCL (ref 4.5–11.5)

## 2024-01-04 PROCEDURE — 36415 COLL VENOUS BLD VENIPUNCTURE: CPT

## 2024-01-04 PROCEDURE — 85025 COMPLETE CBC W/AUTO DIFF WBC: CPT

## 2024-01-04 PROCEDURE — 80053 COMPREHEN METABOLIC PANEL: CPT

## 2024-01-04 PROCEDURE — 84153 ASSAY OF PSA TOTAL: CPT

## 2024-01-09 ENCOUNTER — OFFICE VISIT (OUTPATIENT)
Dept: HEMATOLOGY/ONCOLOGY | Facility: CLINIC | Age: 64
End: 2024-01-09
Payer: COMMERCIAL

## 2024-01-09 VITALS
TEMPERATURE: 98 F | SYSTOLIC BLOOD PRESSURE: 127 MMHG | BODY MASS INDEX: 34.59 KG/M2 | HEART RATE: 63 BPM | RESPIRATION RATE: 18 BRPM | WEIGHT: 233.56 LBS | DIASTOLIC BLOOD PRESSURE: 85 MMHG | HEIGHT: 69 IN | OXYGEN SATURATION: 99 %

## 2024-01-09 DIAGNOSIS — Z79.818 ANDROGEN DEPRIVATION THERAPY: ICD-10-CM

## 2024-01-09 DIAGNOSIS — C79.51 MALIGNANT NEOPLASM METASTATIC TO BONE: ICD-10-CM

## 2024-01-09 DIAGNOSIS — C61 PROSTATE CA: Primary | ICD-10-CM

## 2024-01-09 PROCEDURE — 99214 OFFICE O/P EST MOD 30 MIN: CPT | Mod: PBBFAC | Performed by: NURSE PRACTITIONER

## 2024-01-09 PROCEDURE — 99215 OFFICE O/P EST HI 40 MIN: CPT | Mod: S$GLB,,, | Performed by: NURSE PRACTITIONER

## 2024-01-09 PROCEDURE — 99999 PR PBB SHADOW E&M-EST. PATIENT-LVL IV: CPT | Mod: PBBFAC,,, | Performed by: NURSE PRACTITIONER

## 2024-01-09 NOTE — PROGRESS NOTES
Subjective:       Patient ID: Manjinder Dong is a 63 y.o. male.    Chief Complaint: Follow-up (Patient has no concerns today)        Diagnosis:  1.  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  2.  [place] 7 hormone sensitive metastatic prostate cancer with bone metastases diagnosed in May, 2015.  Previously followed by Dr. Heaton  3.  Bone pain secondary to #1.  well controlled.   4.  Hot flashes-- stable   5.  Chronic joint pain secondary to effects of Lupron and Xtandi    Current Treatment:   1.  Xtandi 160 mg po daily (written for 12/11/2018)  start date 12/2018  2.  Lupron every 6 months with Dr. Adams  3.  Xgeva 120 mg subcu monthly.  Changed to every 3 months  September 6, 2017.  Currently on hold due to gum issues     Treatment History:  1.  Taxotere ×6 cycles completed in 2016.  Exact dates uncertain based on available records from [facility]  2.  Lupron q 6 months started May 2015.    3.  Casodex stopped 11/2017  4.  Xofigo Q4W x 6 treatments  +  palliative radiation to back/hips-- completed #6 of 6   9/14/18  5.  Short course of XRT per Rad/Onc based on STAMPEDE study, plan for 20 fractions----started July 2020.    HPI:  Mr. Dong is a very pleasant 63-year-old gentleman kindly referred by Dr. Adams for second opinion for known history of metastatic prostate cancer.  The patient's history is remarkable for rising PSA back in 2015 initially discovered to be 27 in January 2015, and subsequently rising to 44 by May 2015 which prompted the biopsy.  Biopsy revealed [place] 7 T4 being prostate cancer.  Scans done at that time included bone scan and CTs which revealed bone lesions throughout the thoracic and lumbar vertebral bodies along with the ribs, additional lesions in the pelvis, as well as enlarged left internal and external iliac metastatic nodes.  The  patient reportedly underwent radiation to the prostate although I do not have records to definitively confirm this.  He was started on Lupron as well as given 6 cycles of Taxotere with Dr. Heaton which was completed in 2016.   He seemed to tolerate treatment well and had significant decline in his PSA going down to 0.11 as of December 2015 and reportedly staying close to that value in the ensuing several months.  He was receiving every 6 month Lupron under the care of Dr. Adams and was also on Casodex.  He also received monthly Xgeva under the care of Dr. Heaton.  However, for various reasons he requested to have his oncologic care changed and was therefore referred to [facility].  He underwent MRI of the brain in April 2016 which showed no significant pathology.  Otherwise prior CT and bone scans were done in November 2015     Restaging CT and bone scans performed March 1, 2018 shows several sclerotic lesions in the thoracic spine, left scapula, right ribs. Most of the lesions that can be compared to the prior CT scan of the abdomen and pelvis performed at our [facility] in 2015 show relative stability. Corresponding bone scan done March 1, 2018 shows increased uptake at T7. Other areas noted on both bone scan that correspond to the CT scan, reveal sclerotic areas consistent with possible treated/healed metastases.  PSA on the same day was 0.52 .  MRI of the spine done 3/20/2018 noted sclerotic metastases to left L1 pedicle and iliac bones bilaterally.  Multilevel disc disease is present- worst at L4-5 where left paracentral and left foraminal disc extrusion is present causing suspected impingment of the left L4/L5 nerve roots. Focal bone marrow lesions in T1, T7 and L1 are consistent with metastases.  Patient then underwent T4/T5 spinal fusion surgery by Dr. Turner April 26, 2018, along with kyphoplasty of one vertebral body.  Patient then treated with Xofigo in August and September of  2018.         Restaging CT and bone scans ordered by Dr. Lane on 12/4/2018 showed stable osseous mets of the right acetabulum, b/l iliac bones, L1 vertebra, T-spine.  No soft tissue disease.  Patient was then started on Xtandi 160 mg/day during his visit on 12/11/2018.  PSAs showed good response.  Bilateral diagnostic MMG on 3/3/2020 ordered due to pain and gynecomastia showed no mammographic evidence of malignancy; bilateral gynecomastia is benign and read as BIRADS 2.  PSA monitoring continued and has remained stable, most recent level <0.02 on 3/9/2021.     CT scan of the chest, abdomen, and pelvis on 7/9/2021 showed stable osseous metastatic disease with no evidence of disease in the chest, abdomen, and pelvis.  Bone scan showed uptake in the right femoral proximal diaphyseal focal uptake concerning for metastatic lesion.     Called by patient's wife on 7/15/2021, the day after I saw him.  While shopping in [place], he stumbled and had a right femoral fracture and underwent surgery in [place] by Dr. Forman.  Biopsy taken.  Of note, I offered to have patient get MRI on 7/14/2021, but he requested that we do it next week.  He will likely need radiation.      Right femur fracture on 7/15/2021, underwent claudette placement for stabilization @ [facility] in [place], pathology showed fragments of skeletal muscle and bone with features consistent with fracture.  No evidence of metastatic carcinoma.    PSMA PET/CT scan done on 2/6/2023 showed an area of activity in the prostate, no evidence of bone are non osseous metastatic disease.  Patient treated by Dr. Nunn.      Interval History:   Patient is here today for follow-up for prostate cancer. He remains on Xtandi and Lupron and continues to tolerate them fairly well. He denies any new or persistent pain. No bowel changes are changes in urine stream.      Past Medical History:   Diagnosis Date    Arthritis     COPD (chronic obstructive pulmonary  disease)     HTN (hypertension)     Malignant neoplasm metastatic to bone     Prostate CA       Past Surgical History:   Procedure Laterality Date    BACK SURGERY N/A     Lower back in     FRACTURE SURGERY      INTRAMEDULLARY RODDING OF FEMUR Right 07/15/2001    PROSTATE BIOPSY N/A     SPINAL FUSION N/A     T4/T5 in 2018     Social History     Socioeconomic History    Marital status:    Tobacco Use    Smoking status: Former     Current packs/day: 0.00     Average packs/day: 1.5 packs/day for 35.0 years (52.5 ttl pk-yrs)     Types: Cigarettes     Start date: 1980     Quit date: 2015     Years since quittin.0    Smokeless tobacco: Never    Tobacco comments:     Quit in 2016   Substance and Sexual Activity    Alcohol use: Yes    Drug use: Never      Family History   Family history unknown: Yes      Review of patient's allergies indicates:  No Known Allergies   Review of Systems   Constitutional:  Negative for appetite change and unexpected weight change.   HENT:  Negative for mouth sores.    Eyes:  Negative for visual disturbance.   Respiratory:  Positive for shortness of breath. Negative for cough.    Cardiovascular:  Negative for chest pain.   Gastrointestinal:  Negative for abdominal pain and diarrhea.   Genitourinary:  Negative for frequency.   Musculoskeletal:  Positive for back pain.   Integumentary:  Negative for rash.   Neurological:  Negative for headaches.   Hematological:  Negative for adenopathy.   Psychiatric/Behavioral:  The patient is not nervous/anxious.          Objective:      Physical Exam  Vitals reviewed.   Constitutional:       General: He is not in acute distress.     Appearance: Normal appearance.   HENT:      Head: Normocephalic and atraumatic.   Eyes:      General: Lids are normal. No scleral icterus.     Extraocular Movements: Extraocular movements intact.      Conjunctiva/sclera: Conjunctivae normal.   Cardiovascular:      Rate and Rhythm: Normal rate and regular  rhythm.      Heart sounds: Normal heart sounds.   Pulmonary:      Effort: Pulmonary effort is normal.      Breath sounds: Normal breath sounds.   Abdominal:      General: Bowel sounds are normal. There is no distension.      Palpations: Abdomen is soft.      Tenderness: There is no abdominal tenderness. There is no guarding.   Musculoskeletal:         General: No swelling, tenderness or deformity.      Cervical back: Neck supple. No tenderness. No muscular tenderness.      Right lower leg: No edema.      Left lower leg: No edema.      Comments: Brace to the left lower extremity   Lymphadenopathy:      Cervical: No cervical adenopathy.      Upper Body:      Right upper body: No supraclavicular or axillary adenopathy.      Left upper body: No supraclavicular or axillary adenopathy.   Skin:     General: Skin is warm and dry.      Coloration: Skin is not jaundiced or pale.      Findings: No rash.   Neurological:      General: No focal deficit present.      Mental Status: He is alert and oriented to person, place, and time.      Motor: Motor function is intact. No weakness.      Gait: Gait is intact.   Psychiatric:         Mood and Affect: Mood normal.         Speech: Speech normal.         Behavior: Behavior normal. Behavior is cooperative.       LABS REVIEWED IN [facility]        Assessment:     1.  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  2.  [place] 7 hormone sensitive metastatic prostate cancer with bone metastases diagnosed in May, 2015.  Previously followed by Dr. Heaton  3.  Bone pain secondary to #1.  well controlled.   4.  Hot flashes-- stable   5.  Chronic joint pain secondary to effects of Lupron and Xtandi        Plan:       At this time, the patient will continue on his Xtandi.      PSA stable     Continue to HOLD Xgeva due to exposed bone on the right side of his jaw-- follow up  with oral surgery for further recommendations      He will continue Lupron every 6 months with Dr. Adams     Return to clinic in 3 months for OV and  clinical exam     Labs: CBC, CMP, and PSA     He knows to call with any new/worsening symptoms         Olya Hurt NP

## 2024-02-26 DIAGNOSIS — C61 PROSTATE CA: Primary | ICD-10-CM

## 2024-03-07 ENCOUNTER — HOSPITAL ENCOUNTER (OUTPATIENT)
Dept: RADIOLOGY | Facility: HOSPITAL | Age: 64
Discharge: HOME | End: 2024-03-07
Attending: UROLOGY
Payer: COMMERCIAL

## 2024-03-07 DIAGNOSIS — C61 PROSTATE CA: ICD-10-CM

## 2024-03-07 PROCEDURE — 78815 PET IMAGE W/CT SKULL-THIGH: CPT | Mod: TC

## 2024-03-07 PROCEDURE — A9596 HC GALLIUM GA-68 GOZETOTIDE, DX (ILLUCCIX), PER 1 MCI: HCPCS | Mod: TB | Performed by: UROLOGY

## 2024-03-07 RX ADMIN — KIT FOR THE PREPARATION OF GALLIUM GA 68 GOZETOTIDE INJECTION 5.7 MILLICURIE: KIT INTRAVENOUS at 01:03

## 2024-05-03 ENCOUNTER — LAB VISIT (OUTPATIENT)
Dept: LAB | Facility: HOSPITAL | Age: 64
End: 2024-05-03
Attending: INTERNAL MEDICINE
Payer: COMMERCIAL

## 2024-05-03 DIAGNOSIS — C79.51 MALIGNANT NEOPLASM METASTATIC TO BONE: ICD-10-CM

## 2024-05-03 DIAGNOSIS — C61 PROSTATE CA: ICD-10-CM

## 2024-05-03 LAB
ALBUMIN SERPL-MCNC: 4.3 G/DL (ref 3.4–4.8)
ALBUMIN/GLOB SERPL: 1.5 RATIO (ref 1.1–2)
ALP SERPL-CCNC: 49 UNIT/L (ref 40–150)
ALT SERPL-CCNC: 22 UNIT/L (ref 0–55)
AST SERPL-CCNC: 22 UNIT/L (ref 5–34)
BASOPHILS # BLD AUTO: 0.04 X10(3)/MCL
BASOPHILS NFR BLD AUTO: 0.6 %
BILIRUB SERPL-MCNC: 0.6 MG/DL
BUN SERPL-MCNC: 21.4 MG/DL (ref 8.4–25.7)
CALCIUM SERPL-MCNC: 10 MG/DL (ref 8.8–10)
CHLORIDE SERPL-SCNC: 107 MMOL/L (ref 98–107)
CO2 SERPL-SCNC: 22 MMOL/L (ref 23–31)
CREAT SERPL-MCNC: 1.04 MG/DL (ref 0.73–1.18)
EOSINOPHIL # BLD AUTO: 0.29 X10(3)/MCL (ref 0–0.9)
EOSINOPHIL NFR BLD AUTO: 4 %
ERYTHROCYTE [DISTWIDTH] IN BLOOD BY AUTOMATED COUNT: 13.3 % (ref 11.5–17)
GFR SERPLBLD CREATININE-BSD FMLA CKD-EPI: >60 MLS/MIN/1.73/M2
GLOBULIN SER-MCNC: 2.9 GM/DL (ref 2.4–3.5)
GLUCOSE SERPL-MCNC: 93 MG/DL (ref 82–115)
HCT VFR BLD AUTO: 41 % (ref 42–52)
HGB BLD-MCNC: 14.3 G/DL (ref 14–18)
IMM GRANULOCYTES # BLD AUTO: 0.01 X10(3)/MCL (ref 0–0.04)
IMM GRANULOCYTES NFR BLD AUTO: 0.1 %
LYMPHOCYTES # BLD AUTO: 2.5 X10(3)/MCL (ref 0.6–4.6)
LYMPHOCYTES NFR BLD AUTO: 34.6 %
MCH RBC QN AUTO: 31.6 PG (ref 27–31)
MCHC RBC AUTO-ENTMCNC: 34.9 G/DL (ref 33–36)
MCV RBC AUTO: 90.7 FL (ref 80–94)
MONOCYTES # BLD AUTO: 0.53 X10(3)/MCL (ref 0.1–1.3)
MONOCYTES NFR BLD AUTO: 7.3 %
NEUTROPHILS # BLD AUTO: 3.85 X10(3)/MCL (ref 2.1–9.2)
NEUTROPHILS NFR BLD AUTO: 53.4 %
PLATELET # BLD AUTO: 206 X10(3)/MCL (ref 130–400)
PMV BLD AUTO: 9.3 FL (ref 7.4–10.4)
POTASSIUM SERPL-SCNC: 4.4 MMOL/L (ref 3.5–5.1)
PROT SERPL-MCNC: 7.2 GM/DL (ref 5.8–7.6)
PSA SERPL-MCNC: <0.1 NG/ML
RBC # BLD AUTO: 4.52 X10(6)/MCL (ref 4.7–6.1)
SODIUM SERPL-SCNC: 139 MMOL/L (ref 136–145)
WBC # SPEC AUTO: 7.22 X10(3)/MCL (ref 4.5–11.5)

## 2024-05-03 PROCEDURE — 84153 ASSAY OF PSA TOTAL: CPT

## 2024-05-03 PROCEDURE — 36415 COLL VENOUS BLD VENIPUNCTURE: CPT

## 2024-05-03 PROCEDURE — 85025 COMPLETE CBC W/AUTO DIFF WBC: CPT

## 2024-05-03 PROCEDURE — 80053 COMPREHEN METABOLIC PANEL: CPT

## 2024-05-06 DIAGNOSIS — C79.51 MALIGNANT NEOPLASM METASTATIC TO BONE: ICD-10-CM

## 2024-05-06 DIAGNOSIS — C61 PROSTATE CA: ICD-10-CM

## 2024-05-06 RX ORDER — ENZALUTAMIDE 80 MG/1
2 TABLET ORAL DAILY
Qty: 60 TABLET | Refills: 3 | Status: SHIPPED | OUTPATIENT
Start: 2024-05-06

## 2024-05-09 ENCOUNTER — OFFICE VISIT (OUTPATIENT)
Dept: HEMATOLOGY/ONCOLOGY | Facility: CLINIC | Age: 64
End: 2024-05-09
Payer: COMMERCIAL

## 2024-05-09 VITALS
OXYGEN SATURATION: 99 % | DIASTOLIC BLOOD PRESSURE: 78 MMHG | RESPIRATION RATE: 18 BRPM | WEIGHT: 220 LBS | HEART RATE: 89 BPM | BODY MASS INDEX: 32.49 KG/M2 | SYSTOLIC BLOOD PRESSURE: 110 MMHG

## 2024-05-09 DIAGNOSIS — C61 PROSTATE CA: Primary | ICD-10-CM

## 2024-05-09 DIAGNOSIS — C79.51 MALIGNANT NEOPLASM METASTATIC TO BONE: ICD-10-CM

## 2024-05-09 PROCEDURE — 1160F RVW MEDS BY RX/DR IN RCRD: CPT | Mod: CPTII,S$GLB,, | Performed by: INTERNAL MEDICINE

## 2024-05-09 PROCEDURE — 3008F BODY MASS INDEX DOCD: CPT | Mod: CPTII,S$GLB,, | Performed by: INTERNAL MEDICINE

## 2024-05-09 PROCEDURE — 3074F SYST BP LT 130 MM HG: CPT | Mod: CPTII,S$GLB,, | Performed by: INTERNAL MEDICINE

## 2024-05-09 PROCEDURE — 4010F ACE/ARB THERAPY RXD/TAKEN: CPT | Mod: CPTII,S$GLB,, | Performed by: INTERNAL MEDICINE

## 2024-05-09 PROCEDURE — 3078F DIAST BP <80 MM HG: CPT | Mod: CPTII,S$GLB,, | Performed by: INTERNAL MEDICINE

## 2024-05-09 PROCEDURE — 1159F MED LIST DOCD IN RCRD: CPT | Mod: CPTII,S$GLB,, | Performed by: INTERNAL MEDICINE

## 2024-05-09 PROCEDURE — 99999 PR PBB SHADOW E&M-EST. PATIENT-LVL IV: CPT | Mod: PBBFAC,,, | Performed by: INTERNAL MEDICINE

## 2024-05-09 PROCEDURE — 99214 OFFICE O/P EST MOD 30 MIN: CPT | Mod: S$GLB,,, | Performed by: INTERNAL MEDICINE

## 2024-05-09 NOTE — PROGRESS NOTES
Subjective:       Patient ID: Manjinder Dong is a 63 y.o. male.    Chief Complaint: Follow-up (Patient reports diarrhea)        Diagnosis:  Stage IV Castrate  resistant Metastatic Prostate cancer --> as of 2/2018 with rising PSA and subsequent scan showing possible bone metastatic progression in early March 2018.  [place] 7 hormone sensitive metastatic prostate cancer with bone metastases diagnosed in May, 2015.  Previously followed by Dr. Heaton  Bone pain secondary to #1.  well controlled.   Hot flashes-- stable   Chronic joint pain secondary to effects of Lupron and Xtandi    Current Treatment:   Xtandi 160 mg po daily (written for 12/11/2018)  start date 12/2018  Lupron every 6 months with Dr. Adams  Xgeva 120 mg subcu monthly.  Changed to every 3 months  September 6, 2017.  Currently on hold due to gum issues     Treatment History:  Taxotere ×6 cycles completed in 2016.  Exact dates uncertain based on available records from [facility]  Lupron q 6 months started May 2015.    Casodex stopped 11/2017  Xofigo Q4W x 6 treatments  +  palliative radiation to back/hips-- completed #6 of 6   9/14/18  Short course of XRT per Rad/Onc based on STAMPEDE study, plan for 20 fractions----started July 2020.     HPI:  Mr. Dong is a very pleasant 63-year-old gentleman kindly referred by Dr. Adams for second opinion for known history of metastatic prostate cancer.  The patient's history is remarkable for rising PSA back in 2015 initially discovered to be 27 in January 2015, and subsequently rising to 44 by May 2015 which prompted the biopsy.  Biopsy revealed [place] 7 T4 being prostate cancer.  Scans done at that time included bone scan and CTs which revealed bone lesions throughout the thoracic and lumbar vertebral bodies along with the ribs, additional lesions in the pelvis, as well as enlarged left internal and external iliac metastatic nodes.  The patient reportedly underwent radiation to the prostate  although I do not have records to definitively confirm this.  He was started on Lupron as well as given 6 cycles of Taxotere with Dr. Heaton which was completed in 2016.   He seemed to tolerate treatment well and had significant decline in his PSA going down to 0.11 as of December 2015 and reportedly staying close to that value in the ensuing several months.  He was receiving every 6 month Lupron under the care of Dr. Adams and was also on Casodex.  He also received monthly Xgeva under the care of Dr. Heaton.  However, for various reasons he requested to have his oncologic care changed and was therefore referred to [facility].  He underwent MRI of the brain in April 2016 which showed no significant pathology.  Otherwise prior CT and bone scans were done in November 2015     Restaging CT and bone scans performed March 1, 2018 shows several sclerotic lesions in the thoracic spine, left scapula, right ribs. Most of the lesions that can be compared to the prior CT scan of the abdomen and pelvis performed at [facility] in 2015 show relative stability. Corresponding bone scan done March 1, 2018 shows increased uptake at T7. Other areas noted on both bone scan that correspond to the CT scan, reveal sclerotic areas consistent with possible treated/healed metastases.  PSA on the same day was 0.52 .  MRI of the spine done 3/20/2018 noted sclerotic metastases to left L1 pedicle and iliac bones bilaterally.  Multilevel disc disease is present- worst at L4-5 where left paracentral and left foraminal disc extrusion is present causing suspected impingment of the left L4/L5 nerve roots. Focal bone marrow lesions in T1, T7 and L1 are consistent with metastases.  Patient then underwent T4/T5 spinal fusion surgery by Dr. Turner April 26, 2018, along with kyphoplasty of one vertebral body.  Patient then treated with Xofigo in August and September of 2018.         Restaging CT and bone scans ordered by  Dr. Lane on 12/4/2018 showed stable osseous mets of the right acetabulum, b/l iliac bones, L1 vertebra, T-spine.  No soft tissue disease.  Patient was then started on Xtandi 160 mg/day during his visit on 12/11/2018.  PSAs showed good response.  Bilateral diagnostic MMG on 3/3/2020 ordered due to pain and gynecomastia showed no mammographic evidence of malignancy; bilateral gynecomastia is benign and read as BIRADS 2.  PSA monitoring continued and has remained stable, most recent level <0.02 on 3/9/2021.     CT scan of the chest, abdomen, and pelvis on 7/9/2021 showed stable osseous metastatic disease with no evidence of disease in the chest, abdomen, and pelvis.  Bone scan showed uptake in the right femoral proximal diaphyseal focal uptake concerning for metastatic lesion.     Called by patient's wife on 7/15/2021, the day after I saw him.  While shopping in [place], he stumbled and had a right femoral fracture and underwent surgery in [place] by Dr. Forman.  Biopsy taken.  Of note, I offered to have patient get MRI on 7/14/2021, but he requested that we do it next week.  He will likely need radiation.      Right femur fracture on 7/15/2021, underwent claudette placement for stabilization @ [facility] in [place], pathology showed fragments of skeletal muscle and bone with features consistent with fracture.  No evidence of metastatic carcinoma.    Most recent PSMA PET/CT scan on 03/08/2024 showed no PET avid malignancy.      Interval History:   Patient is here today for follow-up for prostate cancer. He remains on Xtandi and Lupron and continues to tolerate them fairly well. He denies any new or persistent pain.  He does continue to have left foot drop, he states that his toes are starting to become deformed in his midfoot has a hump.  Otherwise, since the beginning of the year, he has had COVID, pneumonia, and other issues.      Past Medical History:   Diagnosis Date    Arthritis     COPD  (chronic obstructive pulmonary disease)     HTN (hypertension)     Malignant neoplasm metastatic to bone     Prostate CA       Past Surgical History:   Procedure Laterality Date    BACK SURGERY N/A     Lower back in     FRACTURE SURGERY      INTRAMEDULLARY RODDING OF FEMUR Right 07/15/2001    PROSTATE BIOPSY N/A     SPINAL FUSION N/A     T4/T5 in 2018     Social History     Socioeconomic History    Marital status:    Tobacco Use    Smoking status: Former     Current packs/day: 0.00     Average packs/day: 1.5 packs/day for 35.0 years (52.5 ttl pk-yrs)     Types: Cigarettes     Start date: 1980     Quit date: 2015     Years since quittin.3    Smokeless tobacco: Never    Tobacco comments:     Quit in 2016   Substance and Sexual Activity    Alcohol use: Yes    Drug use: Never      Family History   Family history unknown: Yes      Review of patient's allergies indicates:  No Known Allergies   Review of Systems   Constitutional:  Negative for appetite change and unexpected weight change.   HENT:  Negative for mouth sores.    Eyes:  Positive for visual disturbance.   Respiratory:  Positive for shortness of breath. Negative for cough.    Cardiovascular:  Negative for chest pain.   Gastrointestinal:  Positive for diarrhea. Negative for abdominal pain.   Genitourinary:  Positive for frequency.   Musculoskeletal:  Positive for back pain.   Integumentary:  Negative for rash.   Neurological:  Negative for headaches.   Hematological:  Negative for adenopathy.   Psychiatric/Behavioral:  The patient is not nervous/anxious.          Objective:      Physical Exam  Vitals reviewed.   Constitutional:       General: He is not in acute distress.     Appearance: Normal appearance.   HENT:      Head: Normocephalic and atraumatic.   Eyes:      General: Lids are normal. No scleral icterus.     Extraocular Movements: Extraocular movements intact.      Conjunctiva/sclera: Conjunctivae normal.   Cardiovascular:       Rate and Rhythm: Normal rate and regular rhythm.      Heart sounds: Normal heart sounds.   Pulmonary:      Effort: Pulmonary effort is normal.      Breath sounds: Normal breath sounds.   Abdominal:      General: Bowel sounds are normal. There is no distension.      Palpations: Abdomen is soft.      Tenderness: There is no abdominal tenderness. There is no guarding.   Musculoskeletal:         General: No swelling, tenderness or deformity.      Cervical back: Neck supple. No tenderness. No muscular tenderness.      Right lower leg: No edema.      Left lower leg: No edema.      Comments: Brace to the left lower extremity   Lymphadenopathy:      Cervical: No cervical adenopathy.      Upper Body:      Right upper body: No supraclavicular or axillary adenopathy.      Left upper body: No supraclavicular or axillary adenopathy.   Skin:     General: Skin is warm and dry.      Coloration: Skin is not jaundiced or pale.      Findings: No rash.   Neurological:      General: No focal deficit present.      Mental Status: He is alert and oriented to person, place, and time.      Motor: Motor function is intact. No weakness.      Gait: Gait is intact.   Psychiatric:         Mood and Affect: Mood normal.         Speech: Speech normal.         Behavior: Behavior normal. Behavior is cooperative.       LABS REVIEWED IN [facility]        Assessment:     1.  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  2.  [place] 7 hormone sensitive metastatic prostate cancer with bone metastases diagnosed in May, 2015.  Previously followed by Dr. Heaton  3.  Bone pain secondary to #1.  well controlled.   4.  Hot flashes-- stable   5.  Chronic joint pain secondary to effects of Lupron and Xtandi        Plan:       At this time, the patient will continue on his Xtandi.      PSA stable and undetectable.     Continue to HOLD Xgeva  due to exposed bone on the right side of his jaw-- follow up with oral surgery for further recommendations      He will continue Lupron every 6 months with Dr. Adams     Return to clinic in 3 months for OV and  clinical exam     Labs: CBC, CMP, and PSA     He knows to call with any new/worsening symptoms         Jorge Flores II, MD

## 2024-08-02 ENCOUNTER — LAB VISIT (OUTPATIENT)
Dept: LAB | Facility: HOSPITAL | Age: 64
End: 2024-08-02
Attending: INTERNAL MEDICINE
Payer: COMMERCIAL

## 2024-08-02 DIAGNOSIS — C79.51 MALIGNANT NEOPLASM METASTATIC TO BONE: ICD-10-CM

## 2024-08-02 DIAGNOSIS — C61 PROSTATE CA: ICD-10-CM

## 2024-08-02 LAB
ALBUMIN SERPL-MCNC: 4.4 G/DL (ref 3.4–4.8)
ALBUMIN/GLOB SERPL: 1.6 RATIO (ref 1.1–2)
ALP SERPL-CCNC: 49 UNIT/L (ref 40–150)
ALT SERPL-CCNC: 15 UNIT/L (ref 0–55)
ANION GAP SERPL CALC-SCNC: 10 MEQ/L
AST SERPL-CCNC: 18 UNIT/L (ref 5–34)
BASOPHILS # BLD AUTO: 0.05 X10(3)/MCL
BASOPHILS NFR BLD AUTO: 0.8 %
BILIRUB SERPL-MCNC: 0.7 MG/DL
BUN SERPL-MCNC: 31.9 MG/DL (ref 8.4–25.7)
CALCIUM SERPL-MCNC: 10.2 MG/DL (ref 8.8–10)
CHLORIDE SERPL-SCNC: 106 MMOL/L (ref 98–107)
CO2 SERPL-SCNC: 22 MMOL/L (ref 23–31)
CREAT SERPL-MCNC: 1.21 MG/DL (ref 0.73–1.18)
CREAT/UREA NIT SERPL: 26
EOSINOPHIL # BLD AUTO: 0.27 X10(3)/MCL (ref 0–0.9)
EOSINOPHIL NFR BLD AUTO: 4.5 %
ERYTHROCYTE [DISTWIDTH] IN BLOOD BY AUTOMATED COUNT: 13.2 % (ref 11.5–17)
GFR SERPLBLD CREATININE-BSD FMLA CKD-EPI: >60 ML/MIN/1.73/M2
GLOBULIN SER-MCNC: 2.7 GM/DL (ref 2.4–3.5)
GLUCOSE SERPL-MCNC: 105 MG/DL (ref 82–115)
HCT VFR BLD AUTO: 42.5 % (ref 42–52)
HGB BLD-MCNC: 14.8 G/DL (ref 14–18)
IMM GRANULOCYTES # BLD AUTO: 0.01 X10(3)/MCL (ref 0–0.04)
IMM GRANULOCYTES NFR BLD AUTO: 0.2 %
LYMPHOCYTES # BLD AUTO: 2.64 X10(3)/MCL (ref 0.6–4.6)
LYMPHOCYTES NFR BLD AUTO: 44.2 %
MCH RBC QN AUTO: 31.8 PG (ref 27–31)
MCHC RBC AUTO-ENTMCNC: 34.8 G/DL (ref 33–36)
MCV RBC AUTO: 91.4 FL (ref 80–94)
MONOCYTES # BLD AUTO: 0.53 X10(3)/MCL (ref 0.1–1.3)
MONOCYTES NFR BLD AUTO: 8.9 %
NEUTROPHILS # BLD AUTO: 2.47 X10(3)/MCL (ref 2.1–9.2)
NEUTROPHILS NFR BLD AUTO: 41.4 %
PLATELET # BLD AUTO: 198 X10(3)/MCL (ref 130–400)
PMV BLD AUTO: 9.5 FL (ref 7.4–10.4)
POTASSIUM SERPL-SCNC: 4.1 MMOL/L (ref 3.5–5.1)
PROT SERPL-MCNC: 7.1 GM/DL (ref 5.8–7.6)
PSA SERPL-MCNC: <0.1 NG/ML
RBC # BLD AUTO: 4.65 X10(6)/MCL (ref 4.7–6.1)
SODIUM SERPL-SCNC: 138 MMOL/L (ref 136–145)
WBC # BLD AUTO: 5.97 X10(3)/MCL (ref 4.5–11.5)

## 2024-08-02 PROCEDURE — 80053 COMPREHEN METABOLIC PANEL: CPT

## 2024-08-02 PROCEDURE — 36415 COLL VENOUS BLD VENIPUNCTURE: CPT

## 2024-08-02 PROCEDURE — 85025 COMPLETE CBC W/AUTO DIFF WBC: CPT

## 2024-08-02 PROCEDURE — 84153 ASSAY OF PSA TOTAL: CPT

## 2024-08-08 ENCOUNTER — OFFICE VISIT (OUTPATIENT)
Dept: HEMATOLOGY/ONCOLOGY | Facility: CLINIC | Age: 64
End: 2024-08-08
Payer: COMMERCIAL

## 2024-08-08 VITALS
DIASTOLIC BLOOD PRESSURE: 79 MMHG | HEIGHT: 69 IN | RESPIRATION RATE: 18 BRPM | TEMPERATURE: 98 F | OXYGEN SATURATION: 99 % | WEIGHT: 218 LBS | HEART RATE: 73 BPM | SYSTOLIC BLOOD PRESSURE: 115 MMHG | BODY MASS INDEX: 32.29 KG/M2

## 2024-08-08 DIAGNOSIS — Z79.818 ENCOUNTER FOR MONITORING ANDROGEN DEPRIVATION THERAPY: ICD-10-CM

## 2024-08-08 DIAGNOSIS — C61 PROSTATE CA: Primary | ICD-10-CM

## 2024-08-08 DIAGNOSIS — C79.51 MALIGNANT NEOPLASM METASTATIC TO BONE: ICD-10-CM

## 2024-08-08 PROCEDURE — 99214 OFFICE O/P EST MOD 30 MIN: CPT | Mod: S$GLB,,, | Performed by: NURSE PRACTITIONER

## 2024-08-08 PROCEDURE — 1159F MED LIST DOCD IN RCRD: CPT | Mod: CPTII,S$GLB,, | Performed by: NURSE PRACTITIONER

## 2024-08-08 PROCEDURE — 3074F SYST BP LT 130 MM HG: CPT | Mod: CPTII,S$GLB,, | Performed by: NURSE PRACTITIONER

## 2024-08-08 PROCEDURE — 3008F BODY MASS INDEX DOCD: CPT | Mod: CPTII,S$GLB,, | Performed by: NURSE PRACTITIONER

## 2024-08-08 PROCEDURE — 99999 PR PBB SHADOW E&M-EST. PATIENT-LVL IV: CPT | Mod: PBBFAC,,, | Performed by: NURSE PRACTITIONER

## 2024-08-08 PROCEDURE — 4010F ACE/ARB THERAPY RXD/TAKEN: CPT | Mod: CPTII,S$GLB,, | Performed by: NURSE PRACTITIONER

## 2024-08-08 PROCEDURE — 3078F DIAST BP <80 MM HG: CPT | Mod: CPTII,S$GLB,, | Performed by: NURSE PRACTITIONER

## 2024-08-08 PROCEDURE — 1160F RVW MEDS BY RX/DR IN RCRD: CPT | Mod: CPTII,S$GLB,, | Performed by: NURSE PRACTITIONER

## 2024-08-08 RX ORDER — CALCIUM CARBONATE 600 MG
TABLET ORAL
COMMUNITY

## 2024-08-08 RX ORDER — LOPERAMIDE HCL 2 MG
TABLET ORAL
COMMUNITY

## 2024-09-12 DIAGNOSIS — C61 PROSTATE CA: ICD-10-CM

## 2024-09-12 DIAGNOSIS — C79.51 MALIGNANT NEOPLASM METASTATIC TO BONE: ICD-10-CM

## 2024-09-12 RX ORDER — ENZALUTAMIDE 80 MG/1
2 TABLET ORAL DAILY
Qty: 60 TABLET | Refills: 3 | Status: SHIPPED | OUTPATIENT
Start: 2024-09-12

## 2024-11-08 ENCOUNTER — LAB VISIT (OUTPATIENT)
Dept: LAB | Facility: HOSPITAL | Age: 64
End: 2024-11-08
Attending: NURSE PRACTITIONER
Payer: COMMERCIAL

## 2024-11-08 DIAGNOSIS — C79.51 MALIGNANT NEOPLASM METASTATIC TO BONE: ICD-10-CM

## 2024-11-08 DIAGNOSIS — C61 PROSTATE CA: ICD-10-CM

## 2024-11-08 LAB
ALBUMIN SERPL-MCNC: 3.9 G/DL (ref 3.4–4.8)
ALBUMIN/GLOB SERPL: 1.3 RATIO (ref 1.1–2)
ALP SERPL-CCNC: 44 UNIT/L (ref 40–150)
ALT SERPL-CCNC: 16 UNIT/L (ref 0–55)
ANION GAP SERPL CALC-SCNC: 8 MEQ/L
AST SERPL-CCNC: 21 UNIT/L (ref 5–34)
BASOPHILS # BLD AUTO: 0.05 X10(3)/MCL
BASOPHILS NFR BLD AUTO: 0.8 %
BILIRUB SERPL-MCNC: 0.5 MG/DL
BUN SERPL-MCNC: 18.7 MG/DL (ref 8.4–25.7)
CALCIUM SERPL-MCNC: 9.7 MG/DL (ref 8.8–10)
CHLORIDE SERPL-SCNC: 109 MMOL/L (ref 98–107)
CO2 SERPL-SCNC: 24 MMOL/L (ref 23–31)
CREAT SERPL-MCNC: 1 MG/DL (ref 0.72–1.25)
CREAT/UREA NIT SERPL: 19
EOSINOPHIL # BLD AUTO: 0.36 X10(3)/MCL (ref 0–0.9)
EOSINOPHIL NFR BLD AUTO: 5.9 %
ERYTHROCYTE [DISTWIDTH] IN BLOOD BY AUTOMATED COUNT: 12.8 % (ref 11.5–17)
GFR SERPLBLD CREATININE-BSD FMLA CKD-EPI: >60 ML/MIN/1.73/M2
GLOBULIN SER-MCNC: 2.9 GM/DL (ref 2.4–3.5)
GLUCOSE SERPL-MCNC: 98 MG/DL (ref 82–115)
HCT VFR BLD AUTO: 39 % (ref 42–52)
HGB BLD-MCNC: 13.8 G/DL (ref 14–18)
IMM GRANULOCYTES # BLD AUTO: 0.01 X10(3)/MCL (ref 0–0.04)
IMM GRANULOCYTES NFR BLD AUTO: 0.2 %
LYMPHOCYTES # BLD AUTO: 2.37 X10(3)/MCL (ref 0.6–4.6)
LYMPHOCYTES NFR BLD AUTO: 39 %
MCH RBC QN AUTO: 32.8 PG (ref 27–31)
MCHC RBC AUTO-ENTMCNC: 35.4 G/DL (ref 33–36)
MCV RBC AUTO: 92.6 FL (ref 80–94)
MONOCYTES # BLD AUTO: 0.53 X10(3)/MCL (ref 0.1–1.3)
MONOCYTES NFR BLD AUTO: 8.7 %
NEUTROPHILS # BLD AUTO: 2.76 X10(3)/MCL (ref 2.1–9.2)
NEUTROPHILS NFR BLD AUTO: 45.4 %
PLATELET # BLD AUTO: 172 X10(3)/MCL (ref 130–400)
PMV BLD AUTO: 9.1 FL (ref 7.4–10.4)
POTASSIUM SERPL-SCNC: 4.1 MMOL/L (ref 3.5–5.1)
PROT SERPL-MCNC: 6.8 GM/DL (ref 5.8–7.6)
PSA SERPL-MCNC: <0.1 NG/ML
RBC # BLD AUTO: 4.21 X10(6)/MCL (ref 4.7–6.1)
SODIUM SERPL-SCNC: 141 MMOL/L (ref 136–145)
WBC # BLD AUTO: 6.08 X10(3)/MCL (ref 4.5–11.5)

## 2024-11-08 PROCEDURE — 84153 ASSAY OF PSA TOTAL: CPT

## 2024-11-08 PROCEDURE — 85025 COMPLETE CBC W/AUTO DIFF WBC: CPT

## 2024-11-08 PROCEDURE — 36415 COLL VENOUS BLD VENIPUNCTURE: CPT

## 2024-11-08 PROCEDURE — 80053 COMPREHEN METABOLIC PANEL: CPT

## 2024-11-12 ENCOUNTER — OFFICE VISIT (OUTPATIENT)
Dept: HEMATOLOGY/ONCOLOGY | Facility: CLINIC | Age: 64
End: 2024-11-12
Payer: COMMERCIAL

## 2024-11-12 VITALS
OXYGEN SATURATION: 98 % | SYSTOLIC BLOOD PRESSURE: 110 MMHG | DIASTOLIC BLOOD PRESSURE: 73 MMHG | HEART RATE: 106 BPM | RESPIRATION RATE: 18 BRPM | TEMPERATURE: 98 F

## 2024-11-12 DIAGNOSIS — C79.51 MALIGNANT NEOPLASM METASTATIC TO BONE: ICD-10-CM

## 2024-11-12 DIAGNOSIS — Z79.818 ANDROGEN DEPRIVATION THERAPY: ICD-10-CM

## 2024-11-12 DIAGNOSIS — C61 PROSTATE CA: Primary | ICD-10-CM

## 2024-11-12 PROCEDURE — 3078F DIAST BP <80 MM HG: CPT | Mod: CPTII,S$GLB,, | Performed by: NURSE PRACTITIONER

## 2024-11-12 PROCEDURE — 99999 PR PBB SHADOW E&M-EST. PATIENT-LVL IV: CPT | Mod: PBBFAC,,, | Performed by: NURSE PRACTITIONER

## 2024-11-12 PROCEDURE — 99214 OFFICE O/P EST MOD 30 MIN: CPT | Mod: S$GLB,,, | Performed by: NURSE PRACTITIONER

## 2024-11-12 PROCEDURE — 3074F SYST BP LT 130 MM HG: CPT | Mod: CPTII,S$GLB,, | Performed by: NURSE PRACTITIONER

## 2024-11-12 PROCEDURE — 4010F ACE/ARB THERAPY RXD/TAKEN: CPT | Mod: CPTII,S$GLB,, | Performed by: NURSE PRACTITIONER

## 2024-11-12 PROCEDURE — 1159F MED LIST DOCD IN RCRD: CPT | Mod: CPTII,S$GLB,, | Performed by: NURSE PRACTITIONER

## 2024-11-12 PROCEDURE — 1160F RVW MEDS BY RX/DR IN RCRD: CPT | Mod: CPTII,S$GLB,, | Performed by: NURSE PRACTITIONER

## 2024-11-12 NOTE — PROGRESS NOTES
Subjective:       Patient ID: Manjinder Dong is a 64 y.o. male.    Chief Complaint: No chief complaint on file.        Diagnosis:  Stage IV Castrate  resistant Metastatic Prostate cancer --> as of 2/2018 with rising PSA and subsequent scan showing possible bone metastatic progression in early March 2018.  [place] 7 hormone sensitive metastatic prostate cancer with bone metastases diagnosed in May, 2015.  Previously followed by Dr. Heaton  Bone pain secondary to #1.  well controlled.   Hot flashes-- stable   Chronic joint pain secondary to effects of Lupron and Xtandi    Current Treatment:   Xtandi 160 mg po daily (written for 12/11/2018)  start date 12/2018  Lupron every 6 months with Dr. Adams  Xgeva 120 mg subcu monthly.  Changed to every 3 months  September 6, 2017.  Currently on hold due to gum issues     Treatment History:  Taxotere ×6 cycles completed in 2016.  Exact dates uncertain based on available records from [facility]  Lupron q 6 months started May 2015.    Casodex stopped 11/2017  Xofigo Q4W x 6 treatments  +  palliative radiation to back/hips-- completed #6 of 6   9/14/18  Short course of XRT per Rad/Onc based on STAMPEDE study, plan for 20 fractions----started July 2020.     HPI:  Mr. Dong is a very pleasant 64-year-old gentleman kindly referred by Dr. Adams for second opinion for known history of metastatic prostate cancer.  The patient's history is remarkable for rising PSA back in 2015 initially discovered to be 27 in January 2015, and subsequently rising to 44 by May 2015 which prompted the biopsy.  Biopsy revealed [place] 7 T4 being prostate cancer.  Scans done at that time included bone scan and CTs which revealed bone lesions throughout the thoracic and lumbar vertebral bodies along with the ribs, additional lesions in the pelvis, as well as enlarged left internal and external iliac metastatic nodes.  The patient reportedly underwent radiation to the prostate although I do  not have records to definitively confirm this.  He was started on Lupron as well as given 6 cycles of Taxotere with Dr. Heaton which was completed in 2016.   He seemed to tolerate treatment well and had significant decline in his PSA going down to 0.11 as of December 2015 and reportedly staying close to that value in the ensuing several months.  He was receiving every 6 month Lupron under the care of Dr. Adams and was also on Casodex.  He also received monthly Xgeva under the care of Dr. Heaton.  However, for various reasons he requested to have his oncologic care changed and was therefore referred [facility].  He underwent MRI of the brain in April 2016 which showed no significant pathology.  Otherwise prior CT and bone scans were done in November 2015     Restaging CT and bone scans performed March 1, 2018 shows several sclerotic lesions in the thoracic spine, left scapula, right ribs. Most of the lesions that can be compared to the prior CT scan of the abdomen and pelvis performed at [facility] in 2015 show relative stability. Corresponding bone scan done March 1, 2018 shows increased uptake at T7. Other areas noted on both bone scan that correspond to the CT scan, reveal sclerotic areas consistent with possible treated/healed metastases.  PSA on the same day was 0.52 .  MRI of the spine done 3/20/2018 noted sclerotic metastases to left L1 pedicle and iliac bones bilaterally.  Multilevel disc disease is present- worst at L4-5 where left paracentral and left foraminal disc extrusion is present causing suspected impingment of the left L4/L5 nerve roots. Focal bone marrow lesions in T1, T7 and L1 are consistent with metastases.  Patient then underwent T4/T5 spinal fusion surgery by Dr. Turner April 26, 2018, along with kyphoplasty of one vertebral body.  Patient then treated with Xofigo in August and September of 2018.         Restaging CT and bone scans ordered by Dr. Lane on  12/4/2018 showed stable osseous mets of the right acetabulum, b/l iliac bones, L1 vertebra, T-spine.  No soft tissue disease.  Patient was then started on Xtandi 160 mg/day during his visit on 12/11/2018.  PSAs showed good response.  Bilateral diagnostic MMG on 3/3/2020 ordered due to pain and gynecomastia showed no mammographic evidence of malignancy; bilateral gynecomastia is benign and read as BIRADS 2.  PSA monitoring continued and has remained stable, most recent level <0.02 on 3/9/2021.     CT scan of the chest, abdomen, and pelvis on 7/9/2021 showed stable osseous metastatic disease with no evidence of disease in the chest, abdomen, and pelvis.  Bone scan showed uptake in the right femoral proximal diaphyseal focal uptake concerning for metastatic lesion.     Called by patient's wife on 7/15/2021, the day after I saw him.  While shopping in [place], he stumbled and had a right femoral fracture and underwent surgery in [place] by Dr. Forman.  Biopsy taken.  Of note, I offered to have patient get MRI on 7/14/2021, but he requested that we do it next week.  He will likely need radiation.      Right femur fracture on 7/15/2021, underwent claudette placement for stabilization @ [facility] in [place], pathology showed fragments of skeletal muscle and bone with features consistent with fracture.  No evidence of metastatic carcinoma.    Most recent PSMA PET/CT scan on 03/08/2024 showed no PET avid malignancy.      Interval History:   Patient is here today for follow-up for prostate cancer. He remains on Xtandi and Lupron and continues to tolerate them fairly well. He denies any new or persistent pain other than some headaches upon wakening lately.  He states that these are not very bad and he is not interested in scanning his head for this at this time.        Past Medical History:   Diagnosis Date    Arthritis     COPD (chronic obstructive pulmonary disease)     HTN (hypertension)     Malignant  neoplasm metastatic to bone     Prostate CA       Past Surgical History:   Procedure Laterality Date    BACK SURGERY N/A     Lower back in     FRACTURE SURGERY      INTRAMEDULLARY RODDING OF FEMUR Right 07/15/2001    PROSTATE BIOPSY N/A     SPINAL FUSION N/A     T4/T5 in 2018     Social History     Socioeconomic History    Marital status:    Tobacco Use    Smoking status: Former     Current packs/day: 0.00     Average packs/day: 1.5 packs/day for 35.0 years (52.5 ttl pk-yrs)     Types: Cigarettes     Start date: 1980     Quit date: 2015     Years since quittin.8    Smokeless tobacco: Never    Tobacco comments:     Quit in 2016   Substance and Sexual Activity    Alcohol use: Yes    Drug use: Never      Family History   Family history unknown: Yes      Review of patient's allergies indicates:  No Known Allergies   Review of Systems   Constitutional:  Negative for appetite change and unexpected weight change.   HENT:  Negative for mouth sores.    Eyes:  Negative for visual disturbance.   Respiratory:  Positive for shortness of breath. Negative for cough.    Cardiovascular:  Negative for chest pain.   Gastrointestinal:  Negative for abdominal pain and diarrhea.   Genitourinary:  Negative for frequency.   Musculoskeletal:  Negative for back pain.   Integumentary:  Negative for rash.   Neurological:  Positive for headaches.   Hematological:  Negative for adenopathy.   Psychiatric/Behavioral:  The patient is not nervous/anxious.          Objective:      Physical Exam  Vitals reviewed.   Constitutional:       General: He is not in acute distress.     Appearance: Normal appearance.   HENT:      Head: Normocephalic and atraumatic.   Eyes:      General: Lids are normal. No scleral icterus.     Extraocular Movements: Extraocular movements intact.      Conjunctiva/sclera: Conjunctivae normal.   Cardiovascular:      Rate and Rhythm: Normal rate and regular rhythm.      Heart sounds: Normal heart  sounds.   Pulmonary:      Effort: Pulmonary effort is normal.      Breath sounds: Normal breath sounds.   Musculoskeletal:         General: No swelling, tenderness or deformity.      Cervical back: Neck supple. No tenderness. No muscular tenderness.      Right lower leg: No edema.      Left lower leg: No edema.      Comments: Brace to the left lower extremity   Skin:     General: Skin is warm and dry.      Coloration: Skin is not jaundiced or pale.      Findings: No rash.   Neurological:      General: No focal deficit present.      Mental Status: He is alert and oriented to person, place, and time.      Motor: Motor function is intact. No weakness.      Gait: Gait is intact.   Psychiatric:         Mood and Affect: Mood normal.         Speech: Speech normal.         Behavior: Behavior normal. Behavior is cooperative.       LABS REVIEWED IN [facility]        Assessment:     1.  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  2.  [place] 7 hormone sensitive metastatic prostate cancer with bone metastases diagnosed in May, 2015.  Previously followed by Dr. Heaton  3.  Bone pain secondary to #1.  well controlled.   4.  Hot flashes-- stable   5.  Chronic joint pain secondary to effects of Lupron and Xtandi        Plan:       At this time, the patient will continue on his Xtandi.      PSA stable and undetectable.     Continue to HOLD Xgeva due to exposed bone on the right side of his jaw-- follow up with oral surgery for further recommendations      He will continue Lupron every 6 months with Dr. Adams    Offered to get a scan of his head due to headaches lately, he declines at this time but will call if these worsen.      Return to clinic in 3 months for OV and  clinical exam     Labs: CBC, CMP, and PSA     He knows to call with any new/worsening symptoms        MARLYS Rangel

## 2024-12-27 ENCOUNTER — HOSPITAL ENCOUNTER (OUTPATIENT)
Dept: RADIOLOGY | Facility: HOSPITAL | Age: 64
Discharge: HOME | End: 2024-12-27
Attending: INTERNAL MEDICINE
Payer: COMMERCIAL

## 2024-12-27 DIAGNOSIS — Z87.891 FORMER SMOKER: ICD-10-CM

## 2024-12-27 PROCEDURE — 71271 CT THORAX LUNG CANCER SCR C-: CPT | Mod: TC

## 2025-02-01 DIAGNOSIS — C61 PROSTATE CA: ICD-10-CM

## 2025-02-01 DIAGNOSIS — C79.51 MALIGNANT NEOPLASM METASTATIC TO BONE: ICD-10-CM

## 2025-02-03 RX ORDER — ENZALUTAMIDE 80 MG/1
2 TABLET ORAL DAILY
Qty: 60 TABLET | Refills: 3 | Status: SHIPPED | OUTPATIENT
Start: 2025-02-03

## 2025-02-05 ENCOUNTER — LAB VISIT (OUTPATIENT)
Dept: LAB | Facility: HOSPITAL | Age: 65
End: 2025-02-05
Attending: NURSE PRACTITIONER
Payer: COMMERCIAL

## 2025-02-05 DIAGNOSIS — C61 PROSTATE CA: ICD-10-CM

## 2025-02-05 DIAGNOSIS — C79.51 MALIGNANT NEOPLASM METASTATIC TO BONE: ICD-10-CM

## 2025-02-05 LAB
ALBUMIN SERPL-MCNC: 3.6 G/DL (ref 3.4–4.8)
ALBUMIN/GLOB SERPL: 1.2 RATIO (ref 1.1–2)
ALP SERPL-CCNC: 48 UNIT/L (ref 40–150)
ALT SERPL-CCNC: 17 UNIT/L (ref 0–55)
ANION GAP SERPL CALC-SCNC: 9 MEQ/L
AST SERPL-CCNC: 21 UNIT/L (ref 5–34)
BASOPHILS # BLD AUTO: 0.03 X10(3)/MCL
BASOPHILS NFR BLD AUTO: 0.5 %
BILIRUB SERPL-MCNC: 0.5 MG/DL
BUN SERPL-MCNC: 19.3 MG/DL (ref 8.4–25.7)
CALCIUM SERPL-MCNC: 9.2 MG/DL (ref 8.8–10)
CHLORIDE SERPL-SCNC: 109 MMOL/L (ref 98–107)
CO2 SERPL-SCNC: 22 MMOL/L (ref 23–31)
CREAT SERPL-MCNC: 0.89 MG/DL (ref 0.72–1.25)
CREAT/UREA NIT SERPL: 22
EOSINOPHIL # BLD AUTO: 0.28 X10(3)/MCL (ref 0–0.9)
EOSINOPHIL NFR BLD AUTO: 4.7 %
ERYTHROCYTE [DISTWIDTH] IN BLOOD BY AUTOMATED COUNT: 12.9 % (ref 11.5–17)
GFR SERPLBLD CREATININE-BSD FMLA CKD-EPI: >60 ML/MIN/1.73/M2
GLOBULIN SER-MCNC: 2.9 GM/DL (ref 2.4–3.5)
GLUCOSE SERPL-MCNC: 109 MG/DL (ref 82–115)
HCT VFR BLD AUTO: 38.9 % (ref 42–52)
HGB BLD-MCNC: 13.3 G/DL (ref 14–18)
IMM GRANULOCYTES # BLD AUTO: 0.02 X10(3)/MCL (ref 0–0.04)
IMM GRANULOCYTES NFR BLD AUTO: 0.3 %
LYMPHOCYTES # BLD AUTO: 2.21 X10(3)/MCL (ref 0.6–4.6)
LYMPHOCYTES NFR BLD AUTO: 37 %
MCH RBC QN AUTO: 31.4 PG (ref 27–31)
MCHC RBC AUTO-ENTMCNC: 34.2 G/DL (ref 33–36)
MCV RBC AUTO: 92 FL (ref 80–94)
MONOCYTES # BLD AUTO: 0.45 X10(3)/MCL (ref 0.1–1.3)
MONOCYTES NFR BLD AUTO: 7.5 %
NEUTROPHILS # BLD AUTO: 2.99 X10(3)/MCL (ref 2.1–9.2)
NEUTROPHILS NFR BLD AUTO: 50 %
PLATELET # BLD AUTO: 163 X10(3)/MCL (ref 130–400)
PMV BLD AUTO: 9.6 FL (ref 7.4–10.4)
POTASSIUM SERPL-SCNC: 4 MMOL/L (ref 3.5–5.1)
PROT SERPL-MCNC: 6.5 GM/DL (ref 5.8–7.6)
PSA SERPL-MCNC: <0.1 NG/ML
RBC # BLD AUTO: 4.23 X10(6)/MCL (ref 4.7–6.1)
SODIUM SERPL-SCNC: 140 MMOL/L (ref 136–145)
WBC # BLD AUTO: 5.98 X10(3)/MCL (ref 4.5–11.5)

## 2025-02-05 PROCEDURE — 85025 COMPLETE CBC W/AUTO DIFF WBC: CPT

## 2025-02-05 PROCEDURE — 84153 ASSAY OF PSA TOTAL: CPT

## 2025-02-05 PROCEDURE — 80053 COMPREHEN METABOLIC PANEL: CPT

## 2025-02-05 PROCEDURE — 36415 COLL VENOUS BLD VENIPUNCTURE: CPT

## 2025-02-11 NOTE — PROGRESS NOTES
Subjective:       Patient ID: Manjinder Dong is a 64 y.o. male.    Chief Complaint: Follow-up (Patient reports headaches )        Diagnosis:  Stage IV Castrate  resistant Metastatic Prostate cancer --> as of 2/2018 with rising PSA and subsequent scan showing possible bone metastatic progression in early March 2018.  [place] 7 hormone sensitive metastatic prostate cancer with bone metastases diagnosed in May, 2015.  Previously followed by Dr. Heaton  Bone pain secondary to #1.  well controlled.   Hot flashes-- stable   Chronic joint pain secondary to effects of Lupron and Xtandi    Current Treatment:   Xtandi 160 mg po daily (written for 12/11/2018)  start date 12/2018  Lupron every 6 months with Dr. Adams  Xgeva 120 mg subcu monthly.  Changed to every 3 months  September 6, 2017.  Currently on hold due to gum issues     Treatment History:  Taxotere ×6 cycles completed in 2016.  Exact dates uncertain based on available records from [facility]  Lupron q 6 months started May 2015.    Casodex stopped 11/2017  Xofigo Q4W x 6 treatments  +  palliative radiation to back/hips-- completed #6 of 6   9/14/18  Short course of XRT per Rad/Onc based on STAMPEDE study, plan for 20 fractions----started July 2020.     HPI:  64 y.o. gentleman kindly referred by Dr. Adams for second opinion for known history of metastatic prostate cancer.  The patient's history is remarkable for rising PSA back in 2015 initially discovered to be 27 in January 2015, and subsequently rising to 44 by May 2015 which prompted the biopsy.  Biopsy revealed [place] 7 T4 being prostate cancer.  Scans done at that time included bone scan and CTs which revealed bone lesions throughout the thoracic and lumbar vertebral bodies along with the ribs, additional lesions in the pelvis, as well as enlarged left internal and external iliac metastatic nodes.  The patient reportedly underwent radiation to the prostate although I do not have records to  definitively confirm this.  He was started on Lupron as well as given 6 cycles of Taxotere with Dr. Heaton which was completed in 2016.   He seemed to tolerate treatment well and had significant decline in his PSA going down to 0.11 as of December 2015 and reportedly staying close to that value in the ensuing several months.  He was receiving every 6 month Lupron under the care of Dr. Adams and was also on Casodex.  He also received monthly Xgeva under the care of Dr. Heaton.  However, for various reasons he requested to have his oncologic care changed and was therefore referred to [facility].  He underwent MRI of the brain in April 2016 which showed no significant pathology.  Otherwise prior CT and bone scans were done in November 2015     Restaging CT and bone scans performed March 1, 2018 shows several sclerotic lesions in the thoracic spine, left scapula, right ribs. Most of the lesions that can be compared to the prior CT scan of the abdomen and [facility] show relative stability. Corresponding bone scan done March 1, 2018 shows increased uptake at T7. Other areas noted on both bone scan that correspond to the CT scan, reveal sclerotic areas consistent with possible treated/healed metastases.  PSA on the same day was 0.52 .  MRI of the spine done 3/20/2018 noted sclerotic metastases to left L1 pedicle and iliac bones bilaterally.  Multilevel disc disease is present- worst at L4-5 where left paracentral and left foraminal disc extrusion is present causing suspected impingment of the left L4/L5 nerve roots. Focal bone marrow lesions in T1, T7 and L1 are consistent with metastases.  Patient then underwent T4/T5 spinal fusion surgery by Dr. Turner April 26, 2018, along with kyphoplasty of one vertebral body.  Patient then treated with Xofigo in August and September of 2018.         Restaging CT and bone scans ordered by Dr. Lane on 12/4/2018 showed  stable osseous mets of the right acetabulum, b/l iliac bones, L1 vertebra, T-spine.  No soft tissue disease.  Patient was then started on Xtandi 160 mg/day during his visit on 12/11/2018.  PSAs showed good response.  Bilateral diagnostic MMG on 3/3/2020 ordered due to pain and gynecomastia showed no mammographic evidence of malignancy; bilateral gynecomastia is benign and read as BIRADS 2.  PSA monitoring continued and has remained stable, most recent level <0.02 on 3/9/2021.     CT scan of the chest, abdomen, and pelvis on 7/9/2021 showed stable osseous metastatic disease with no evidence of disease in the chest, abdomen, and pelvis.  Bone scan showed uptake in the right femoral proximal diaphyseal focal uptake concerning for metastatic lesion.     Called by patient's wife on 7/15/2021, the day after I saw him.  While shopping in [place], he stumbled and had a right femoral fracture and underwent surgery in [place] by Dr. Forman.  Biopsy taken.  Of note, I offered to have patient get MRI on 7/14/2021, but he requested that we do it next week.  He will likely need radiation.      Right femur fracture on 7/15/2021, underwent claudette placement for stabilization @ [facility] in [place], pathology showed fragments of skeletal muscle and bone with features consistent with fracture.  No evidence of metastatic carcinoma.    Most recent PSMA PET/CT scan on 03/08/2024 showed no PET avid malignancy.      Interval History:   Patient is here today for follow-up for prostate cancer. He remains on Xtandi and Lupron and continues to tolerate them fairly well.  His PSA remains undetectable.  He does state to have some headaches, however he says they are not very bad.  The usually subside on their own.  I did offer imaging, he stated he was not interested at this time.      Past Medical History:   Diagnosis Date    Arthritis     COPD (chronic obstructive pulmonary disease)     HTN (hypertension)     Malignant  neoplasm metastatic to bone     Prostate CA       Past Surgical History:   Procedure Laterality Date    BACK SURGERY N/A     Lower back in 2000    FRACTURE SURGERY      INTRAMEDULLARY RODDING OF FEMUR Right 07/15/2001    PROSTATE BIOPSY N/A     SPINAL FUSION N/A     T4/T5 in April 2018     Social History     Socioeconomic History    Marital status:    Tobacco Use    Smoking status: Former     Current packs/day: 0.00     Average packs/day: 1.5 packs/day for 35.0 years (52.5 ttl pk-yrs)     Types: Cigarettes     Start date: 1/1/1980     Quit date: 1/1/2015     Years since quitting: 10.1    Smokeless tobacco: Never    Tobacco comments:     Quit in 2016   Substance and Sexual Activity    Alcohol use: Yes    Drug use: Never      Family History   Family history unknown: Yes      Review of patient's allergies indicates:  No Known Allergies   Review of Systems   Constitutional:  Negative for appetite change and unexpected weight change.   HENT:  Negative for mouth sores.    Eyes:  Negative for visual disturbance.   Respiratory:  Positive for shortness of breath. Negative for cough.    Cardiovascular:  Negative for chest pain.   Gastrointestinal:  Negative for abdominal pain and diarrhea.   Genitourinary:  Negative for frequency.   Musculoskeletal:  Negative for back pain.   Integumentary:  Negative for rash.   Neurological:  Positive for headaches.   Hematological:  Negative for adenopathy.   Psychiatric/Behavioral:  The patient is not nervous/anxious.          Objective:      Physical Exam  Vitals reviewed.   Constitutional:       General: He is not in acute distress.     Appearance: Normal appearance.   HENT:      Head: Normocephalic and atraumatic.   Eyes:      General: Lids are normal. No scleral icterus.     Extraocular Movements: Extraocular movements intact.      Conjunctiva/sclera: Conjunctivae normal.   Cardiovascular:      Rate and Rhythm: Normal rate and regular rhythm.      Heart sounds: Normal heart  sounds.   Pulmonary:      Effort: Pulmonary effort is normal.      Breath sounds: Normal breath sounds.   Musculoskeletal:         General: No swelling, tenderness or deformity.      Cervical back: Neck supple. No tenderness. No muscular tenderness.      Right lower leg: No edema.      Left lower leg: No edema.      Comments: Brace to the left lower extremity   Skin:     General: Skin is warm and dry.      Coloration: Skin is not jaundiced or pale.      Findings: No rash.   Neurological:      General: No focal deficit present.      Mental Status: He is alert and oriented to person, place, and time.      Motor: Motor function is intact. No weakness.      Gait: Gait is intact.   Psychiatric:         Mood and Affect: Mood normal.         Speech: Speech normal.         Behavior: Behavior normal. Behavior is cooperative.       LABS REVIEWED IN [facility]        Assessment:     1.  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  2.  [place] 7 hormone sensitive metastatic prostate cancer with bone metastases diagnosed in May, 2015.  Previously followed by Dr. Heaton  3.  Bone pain secondary to #1.  well controlled.   4.  Hot flashes-- stable   5.  Chronic joint pain secondary to effects of Lupron and Xtandi        Plan:       At this time, the patient will continue on his Xtandi.      PSA stable and undetectable.     Continue to HOLD Xgeva due to exposed bone on the right side of his jaw-- follow up with oral surgery for further recommendations      He will continue Lupron every 6 months with Dr. Adams    Offered to get a scan of his head due to headaches lately, he declines at this time but will call if these worsen.      Return to clinic in 3 months for OV and  clinical exam     Labs: CBC, CMP, and PSA     He knows to call with any new/worsening symptoms      Visit today included increased  complexity associated with the care of the episodic problem metastatic prostate cancer, addressing and managing the longitudinal care of the patient's metastatic prostate cancer.       Jorge Flores II, MD

## 2025-02-12 ENCOUNTER — OFFICE VISIT (OUTPATIENT)
Dept: HEMATOLOGY/ONCOLOGY | Facility: CLINIC | Age: 65
End: 2025-02-12
Payer: COMMERCIAL

## 2025-02-12 VITALS
RESPIRATION RATE: 18 BRPM | BODY MASS INDEX: 33.63 KG/M2 | DIASTOLIC BLOOD PRESSURE: 81 MMHG | WEIGHT: 227.06 LBS | OXYGEN SATURATION: 99 % | SYSTOLIC BLOOD PRESSURE: 122 MMHG | HEART RATE: 79 BPM | HEIGHT: 69 IN

## 2025-02-12 DIAGNOSIS — C61 PROSTATE CA: Primary | ICD-10-CM

## 2025-02-12 DIAGNOSIS — C79.51 MALIGNANT NEOPLASM METASTATIC TO BONE: ICD-10-CM

## 2025-02-12 PROCEDURE — 99214 OFFICE O/P EST MOD 30 MIN: CPT | Mod: S$GLB,,, | Performed by: INTERNAL MEDICINE

## 2025-02-12 PROCEDURE — 1159F MED LIST DOCD IN RCRD: CPT | Mod: CPTII,S$GLB,, | Performed by: INTERNAL MEDICINE

## 2025-02-12 PROCEDURE — 3079F DIAST BP 80-89 MM HG: CPT | Mod: CPTII,S$GLB,, | Performed by: INTERNAL MEDICINE

## 2025-02-12 PROCEDURE — 3074F SYST BP LT 130 MM HG: CPT | Mod: CPTII,S$GLB,, | Performed by: INTERNAL MEDICINE

## 2025-02-12 PROCEDURE — 3008F BODY MASS INDEX DOCD: CPT | Mod: CPTII,S$GLB,, | Performed by: INTERNAL MEDICINE

## 2025-02-12 PROCEDURE — 4010F ACE/ARB THERAPY RXD/TAKEN: CPT | Mod: CPTII,S$GLB,, | Performed by: INTERNAL MEDICINE

## 2025-02-12 PROCEDURE — G2211 COMPLEX E/M VISIT ADD ON: HCPCS | Mod: S$GLB,,, | Performed by: INTERNAL MEDICINE

## 2025-02-12 PROCEDURE — 99999 PR PBB SHADOW E&M-EST. PATIENT-LVL IV: CPT | Mod: PBBFAC,,, | Performed by: INTERNAL MEDICINE

## 2025-02-12 PROCEDURE — 1160F RVW MEDS BY RX/DR IN RCRD: CPT | Mod: CPTII,S$GLB,, | Performed by: INTERNAL MEDICINE

## 2025-04-14 ENCOUNTER — LAB VISIT (OUTPATIENT)
Dept: LAB | Facility: HOSPITAL | Age: 65
End: 2025-04-14
Attending: INTERNAL MEDICINE
Payer: COMMERCIAL

## 2025-04-14 DIAGNOSIS — R94.8 NONSPECIFIC ABNORMAL RESULTS OF BASAL METABOLISM FUNCTION STUDY: Primary | ICD-10-CM

## 2025-04-14 DIAGNOSIS — R63.4 LOSS OF WEIGHT: ICD-10-CM

## 2025-04-14 DIAGNOSIS — Z12.5 SPECIAL SCREENING FOR MALIGNANT NEOPLASM OF PROSTATE: ICD-10-CM

## 2025-04-14 DIAGNOSIS — I10 HYPERTENSION, ESSENTIAL: ICD-10-CM

## 2025-04-14 DIAGNOSIS — R53.83 FATIGUE, UNSPECIFIED TYPE: ICD-10-CM

## 2025-04-14 DIAGNOSIS — Z00.00 ROUTINE GENERAL MEDICAL EXAMINATION AT A HEALTH CARE FACILITY: ICD-10-CM

## 2025-04-14 LAB
ALBUMIN SERPL-MCNC: 3.8 G/DL (ref 3.4–4.8)
ALBUMIN/GLOB SERPL: 1.2 RATIO (ref 1.1–2)
ALP SERPL-CCNC: 47 UNIT/L (ref 40–150)
ALT SERPL-CCNC: 17 UNIT/L (ref 0–55)
ANION GAP SERPL CALC-SCNC: 10 MEQ/L
AST SERPL-CCNC: 20 UNIT/L (ref 11–45)
BILIRUB SERPL-MCNC: 0.3 MG/DL
BUN SERPL-MCNC: 30.7 MG/DL (ref 8.4–25.7)
CALCIUM SERPL-MCNC: 9.3 MG/DL (ref 8.8–10)
CHLORIDE SERPL-SCNC: 109 MMOL/L (ref 98–107)
CHOLEST SERPL-MCNC: 176 MG/DL
CHOLEST/HDLC SERPL: 4 {RATIO} (ref 0–5)
CO2 SERPL-SCNC: 24 MMOL/L (ref 23–31)
CREAT SERPL-MCNC: 0.97 MG/DL (ref 0.72–1.25)
CREAT/UREA NIT SERPL: 32
ERYTHROCYTE [DISTWIDTH] IN BLOOD BY AUTOMATED COUNT: 13.1 % (ref 11.5–17)
EST. AVERAGE GLUCOSE BLD GHB EST-MCNC: 111.2 MG/DL
GFR SERPLBLD CREATININE-BSD FMLA CKD-EPI: >60 ML/MIN/1.73/M2
GLOBULIN SER-MCNC: 3.2 GM/DL (ref 2.4–3.5)
GLUCOSE SERPL-MCNC: 99 MG/DL (ref 82–115)
HBA1C MFR BLD: 5.5 %
HCT VFR BLD AUTO: 40.6 % (ref 42–52)
HDLC SERPL-MCNC: 44 MG/DL (ref 35–60)
HGB BLD-MCNC: 13.7 G/DL (ref 14–18)
LDLC SERPL CALC-MCNC: 80 MG/DL (ref 50–140)
MCH RBC QN AUTO: 31.5 PG (ref 27–31)
MCHC RBC AUTO-ENTMCNC: 33.7 G/DL (ref 33–36)
MCV RBC AUTO: 93.3 FL (ref 80–94)
NRBC BLD AUTO-RTO: 0 %
PLATELET # BLD AUTO: 221 X10(3)/MCL (ref 130–400)
PMV BLD AUTO: 9.7 FL (ref 7.4–10.4)
POTASSIUM SERPL-SCNC: 3.9 MMOL/L (ref 3.5–5.1)
PROT SERPL-MCNC: 7 GM/DL (ref 5.8–7.6)
PSA SERPL-MCNC: <0.1 NG/ML
RBC # BLD AUTO: 4.35 X10(6)/MCL (ref 4.7–6.1)
SODIUM SERPL-SCNC: 143 MMOL/L (ref 136–145)
T4 FREE SERPL-MCNC: 1.09 NG/DL (ref 0.7–1.48)
TRIGL SERPL-MCNC: 259 MG/DL (ref 34–140)
TSH SERPL-ACNC: 8.96 UIU/ML (ref 0.35–4.94)
VLDLC SERPL CALC-MCNC: 52 MG/DL
WBC # BLD AUTO: 6.14 X10(3)/MCL (ref 4.5–11.5)

## 2025-04-14 PROCEDURE — 80053 COMPREHEN METABOLIC PANEL: CPT

## 2025-04-14 PROCEDURE — 85027 COMPLETE CBC AUTOMATED: CPT

## 2025-04-14 PROCEDURE — 84439 ASSAY OF FREE THYROXINE: CPT

## 2025-04-14 PROCEDURE — 84443 ASSAY THYROID STIM HORMONE: CPT

## 2025-04-14 PROCEDURE — 80061 LIPID PANEL: CPT

## 2025-04-14 PROCEDURE — 83036 HEMOGLOBIN GLYCOSYLATED A1C: CPT

## 2025-04-14 PROCEDURE — 36415 COLL VENOUS BLD VENIPUNCTURE: CPT

## 2025-04-14 PROCEDURE — 84153 ASSAY OF PSA TOTAL: CPT

## 2025-05-06 ENCOUNTER — LAB VISIT (OUTPATIENT)
Dept: LAB | Facility: HOSPITAL | Age: 65
End: 2025-05-06
Attending: INTERNAL MEDICINE
Payer: COMMERCIAL

## 2025-05-06 DIAGNOSIS — C61 PROSTATE CA: ICD-10-CM

## 2025-05-06 DIAGNOSIS — C79.51 MALIGNANT NEOPLASM METASTATIC TO BONE: ICD-10-CM

## 2025-05-06 LAB
ALBUMIN SERPL-MCNC: 3.8 G/DL (ref 3.4–4.8)
ALBUMIN/GLOB SERPL: 1.3 RATIO (ref 1.1–2)
ALP SERPL-CCNC: 44 UNIT/L (ref 40–150)
ALT SERPL-CCNC: 18 UNIT/L (ref 0–55)
ANION GAP SERPL CALC-SCNC: 9 MEQ/L
AST SERPL-CCNC: 21 UNIT/L (ref 11–45)
BASOPHILS # BLD AUTO: 0.05 X10(3)/MCL
BASOPHILS NFR BLD AUTO: 0.9 %
BILIRUB SERPL-MCNC: 0.6 MG/DL
BUN SERPL-MCNC: 16.5 MG/DL (ref 8.4–25.7)
CALCIUM SERPL-MCNC: 9.3 MG/DL (ref 8.8–10)
CHLORIDE SERPL-SCNC: 107 MMOL/L (ref 98–107)
CO2 SERPL-SCNC: 25 MMOL/L (ref 23–31)
CREAT SERPL-MCNC: 0.86 MG/DL (ref 0.72–1.25)
CREAT/UREA NIT SERPL: 19
EOSINOPHIL # BLD AUTO: 0.3 X10(3)/MCL (ref 0–0.9)
EOSINOPHIL NFR BLD AUTO: 5.2 %
ERYTHROCYTE [DISTWIDTH] IN BLOOD BY AUTOMATED COUNT: 13.4 % (ref 11.5–17)
GFR SERPLBLD CREATININE-BSD FMLA CKD-EPI: >60 ML/MIN/1.73/M2
GLOBULIN SER-MCNC: 3 GM/DL (ref 2.4–3.5)
GLUCOSE SERPL-MCNC: 98 MG/DL (ref 82–115)
HCT VFR BLD AUTO: 39.7 % (ref 42–52)
HGB BLD-MCNC: 13.7 G/DL (ref 14–18)
IMM GRANULOCYTES # BLD AUTO: 0.02 X10(3)/MCL (ref 0–0.04)
IMM GRANULOCYTES NFR BLD AUTO: 0.3 %
LYMPHOCYTES # BLD AUTO: 2.27 X10(3)/MCL (ref 0.6–4.6)
LYMPHOCYTES NFR BLD AUTO: 39.5 %
MCH RBC QN AUTO: 32.2 PG (ref 27–31)
MCHC RBC AUTO-ENTMCNC: 34.5 G/DL (ref 33–36)
MCV RBC AUTO: 93.2 FL (ref 80–94)
MONOCYTES # BLD AUTO: 0.54 X10(3)/MCL (ref 0.1–1.3)
MONOCYTES NFR BLD AUTO: 9.4 %
NEUTROPHILS # BLD AUTO: 2.56 X10(3)/MCL (ref 2.1–9.2)
NEUTROPHILS NFR BLD AUTO: 44.7 %
PLATELET # BLD AUTO: 188 X10(3)/MCL (ref 130–400)
PMV BLD AUTO: 9.1 FL (ref 7.4–10.4)
POTASSIUM SERPL-SCNC: 3.9 MMOL/L (ref 3.5–5.1)
PROT SERPL-MCNC: 6.8 GM/DL (ref 5.8–7.6)
PSA SERPL-MCNC: <0.1 NG/ML
RBC # BLD AUTO: 4.26 X10(6)/MCL (ref 4.7–6.1)
SODIUM SERPL-SCNC: 141 MMOL/L (ref 136–145)
WBC # BLD AUTO: 5.74 X10(3)/MCL (ref 4.5–11.5)

## 2025-05-06 PROCEDURE — 80053 COMPREHEN METABOLIC PANEL: CPT

## 2025-05-06 PROCEDURE — 85025 COMPLETE CBC W/AUTO DIFF WBC: CPT

## 2025-05-06 PROCEDURE — 36415 COLL VENOUS BLD VENIPUNCTURE: CPT

## 2025-05-06 PROCEDURE — 84153 ASSAY OF PSA TOTAL: CPT

## 2025-05-13 ENCOUNTER — OFFICE VISIT (OUTPATIENT)
Dept: HEMATOLOGY/ONCOLOGY | Facility: CLINIC | Age: 65
End: 2025-05-13
Payer: COMMERCIAL

## 2025-05-13 VITALS
DIASTOLIC BLOOD PRESSURE: 73 MMHG | OXYGEN SATURATION: 97 % | RESPIRATION RATE: 18 BRPM | HEIGHT: 69 IN | BODY MASS INDEX: 33.31 KG/M2 | WEIGHT: 224.88 LBS | HEART RATE: 82 BPM | SYSTOLIC BLOOD PRESSURE: 104 MMHG

## 2025-05-13 DIAGNOSIS — C61 PROSTATE CA: Primary | ICD-10-CM

## 2025-05-13 DIAGNOSIS — C79.51 MALIGNANT NEOPLASM METASTATIC TO BONE: ICD-10-CM

## 2025-05-13 PROCEDURE — 99999 PR PBB SHADOW E&M-EST. PATIENT-LVL IV: CPT | Mod: PBBFAC,,, | Performed by: NURSE PRACTITIONER

## 2025-05-13 PROCEDURE — 99214 OFFICE O/P EST MOD 30 MIN: CPT | Mod: S$GLB,,, | Performed by: NURSE PRACTITIONER

## 2025-05-13 PROCEDURE — 3008F BODY MASS INDEX DOCD: CPT | Mod: CPTII,S$GLB,, | Performed by: NURSE PRACTITIONER

## 2025-05-13 PROCEDURE — 4010F ACE/ARB THERAPY RXD/TAKEN: CPT | Mod: CPTII,S$GLB,, | Performed by: NURSE PRACTITIONER

## 2025-05-13 PROCEDURE — 3044F HG A1C LEVEL LT 7.0%: CPT | Mod: CPTII,S$GLB,, | Performed by: NURSE PRACTITIONER

## 2025-05-13 PROCEDURE — 3074F SYST BP LT 130 MM HG: CPT | Mod: CPTII,S$GLB,, | Performed by: NURSE PRACTITIONER

## 2025-05-13 PROCEDURE — 1160F RVW MEDS BY RX/DR IN RCRD: CPT | Mod: CPTII,S$GLB,, | Performed by: NURSE PRACTITIONER

## 2025-05-13 PROCEDURE — 3078F DIAST BP <80 MM HG: CPT | Mod: CPTII,S$GLB,, | Performed by: NURSE PRACTITIONER

## 2025-05-13 PROCEDURE — 1159F MED LIST DOCD IN RCRD: CPT | Mod: CPTII,S$GLB,, | Performed by: NURSE PRACTITIONER

## 2025-05-13 RX ORDER — LEVOTHYROXINE SODIUM 100 UG/1
100 TABLET ORAL
COMMUNITY
Start: 2025-04-22

## 2025-05-13 NOTE — PROGRESS NOTES
Subjective:       Patient ID: Manjinder Dong is a 64 y.o. male.    Chief Complaint: Follow-up (Patient has no concerns today)        Diagnosis:  Stage IV Castrate  resistant Metastatic Prostate cancer --> as of 2/2018 with rising PSA and subsequent scan showing possible bone metastatic progression in early March 2018.  Lansing 7 hormone sensitive metastatic prostate cancer with bone metastases diagnosed in May, 2015.  Previously followed by Dr. Heaton  Bone pain secondary to #1.  well controlled.   Hot flashes-- stable   Chronic joint pain secondary to effects of Lupron and Xtandi    Current Treatment:   Xtandi 160 mg po daily (written for 12/11/2018)  start date 12/2018  Lupron every 6 months with Dr. Adams  Xgeva 120 mg subcu monthly.  Changed to every 3 months  September 6, 2017.  Currently on hold due to gum issues     Treatment History:  Taxotere ×6 cycles completed in 2016.  Exact dates uncertain based on available records from [facility]  Lupron q 6 months started May 2015.    Casodex stopped 11/2017  Xofigo Q4W x 6 treatments  +  palliative radiation to back/hips-- completed #6 of 6   9/14/18  Short course of XRT per Rad/Onc based on STAMPEDE study, plan for 20 fractions----started July 2020.     HPI:  64 y.o. gentleman kindly referred by Dr. Adams for second opinion for known history of metastatic prostate cancer.  The patient's history is remarkable for rising PSA back in 2015 initially discovered to be 27 in January 2015, and subsequently rising to 44 by May 2015 which prompted the biopsy.  Biopsy revealed Lansing 7 T4 being prostate cancer.  Scans done at that time included bone scan and CTs which revealed bone lesions throughout the thoracic and lumbar vertebral bodies along with the ribs, additional lesions in the pelvis, as well as enlarged left internal and external iliac metastatic nodes.  The patient reportedly underwent radiation to the prostate although I do not have records to  definitively confirm this.  He was started on Lupron as well as given 6 cycles of Taxotere with Dr. Heaton which was completed in 2016.   He seemed to tolerate treatment well and had significant decline in his PSA going down to 0.11 as of December 2015 and reportedly staying close to that value in the ensuing several months.  He was receiving every 6 month Lupron under the care of Dr. Adams and was also on Casodex.  He also received monthly Xgeva under the care of Dr. Heaton.  However, for various reasons he requested to have his oncologic care changed and was therefore referred to [facility].  He underwent MRI of the brain in April 2016 which showed no significant pathology.  Otherwise prior CT and bone scans were done in November 2015     Restaging CT and bone scans performed March 1, 2018 shows several sclerotic lesions in the thoracic spine, left scapula, right ribs. Most of the lesions that can be compared to the prior CT scan of the abdomen and pelvis [facility] in 2015 show relative stability. Corresponding bone scan done March 1, 2018 shows increased uptake at T7. Other areas noted on both bone scan that correspond to the CT scan, reveal sclerotic areas consistent with possible treated/healed metastases.  PSA on the same day was 0.52 .  MRI of the spine done 3/20/2018 noted sclerotic metastases to left L1 pedicle and iliac bones bilaterally.  Multilevel disc disease is present- worst at L4-5 where left paracentral and left foraminal disc extrusion is present causing suspected impingment of the left L4/L5 nerve roots. Focal bone marrow lesions in T1, T7 and L1 are consistent with metastases.  Patient then underwent T4/T5 spinal fusion surgery by Dr. Turner April 26, 2018, along with kyphoplasty of one vertebral body.  Patient then treated with Xofigo in August and September of 2018.         Restaging CT and bone scans ordered by Dr. Lane on 12/4/2018 showed  stable osseous mets of the right acetabulum, b/l iliac bones, L1 vertebra, T-spine.  No soft tissue disease.  Patient was then started on Xtandi 160 mg/day during his visit on 12/11/2018.  PSAs showed good response.  Bilateral diagnostic MMG on 3/3/2020 ordered due to pain and gynecomastia showed no mammographic evidence of malignancy; bilateral gynecomastia is benign and read as BIRADS 2.  PSA monitoring continued and has remained stable, most recent level <0.02 on 3/9/2021.     CT scan of the chest, abdomen, and pelvis on 7/9/2021 showed stable osseous metastatic disease with no evidence of disease in the chest, abdomen, and pelvis.  Bone scan showed uptake in the right femoral proximal diaphyseal focal uptake concerning for metastatic lesion.     Called by patient's wife on 7/15/2021, the day after I saw him.  While shopping in [place], he stumbled and had a right femoral fracture and underwent surgery in [place] by Dr. Forman.  Biopsy taken.  Of note, I offered to have patient get MRI on 7/14/2021, but he requested that we do it next week.  He will likely need radiation.      Right femur fracture on 7/15/2021, underwent claudette placement for stabilization @ [facility] in [place], pathology showed fragments of skeletal muscle and bone with features consistent with fracture.  No evidence of metastatic carcinoma.    Most recent PSMA PET/CT scan on 03/08/2024 showed no PET avid malignancy.      Interval History:   Patient is here today for follow-up for prostate cancer. He remains on Xtandi and Lupron and continues to tolerate them fairly well.  His PSA remains undetectable.  He well overall though he does continue to have headaches.  He feels that this is related to when he was hit on the left side of his face with a baseball and had broken bones there.  I did offer imaging, he stated he was not interested at this time.  No new persistent pain otherwise.      Past Medical History:   Diagnosis  Date    Arthritis     COPD (chronic obstructive pulmonary disease)     HTN (hypertension)     Malignant neoplasm metastatic to bone     Prostate CA       Past Surgical History:   Procedure Laterality Date    BACK SURGERY N/A     Lower back in 2000    FRACTURE SURGERY      INTRAMEDULLARY RODDING OF FEMUR Right 07/15/2001    PROSTATE BIOPSY N/A     SPINAL FUSION N/A     T4/T5 in April 2018     Social History     Socioeconomic History    Marital status:    Tobacco Use    Smoking status: Former     Current packs/day: 0.00     Average packs/day: 1.5 packs/day for 35.0 years (52.5 ttl pk-yrs)     Types: Cigarettes     Start date: 1/1/1980     Quit date: 1/1/2015     Years since quitting: 10.3    Smokeless tobacco: Never    Tobacco comments:     Quit in 2016   Substance and Sexual Activity    Alcohol use: Yes    Drug use: Never      Family History   Family history unknown: Yes      Review of patient's allergies indicates:  No Known Allergies   Review of Systems   Constitutional:  Negative for appetite change and unexpected weight change.   HENT:  Positive for mouth sores.    Respiratory:  Positive for shortness of breath. Negative for cough.    Cardiovascular:  Negative for chest pain.   Gastrointestinal:  Negative for abdominal pain and diarrhea.   Genitourinary:  Positive for frequency.   Musculoskeletal:  Positive for back pain.   Integumentary:  Negative for rash.   Neurological:  Positive for headaches.   Hematological:  Negative for adenopathy.         Objective:      Physical Exam  Vitals reviewed.   Constitutional:       General: He is not in acute distress.     Appearance: Normal appearance.   HENT:      Head: Normocephalic and atraumatic.   Eyes:      General: Lids are normal. No scleral icterus.     Extraocular Movements: Extraocular movements intact.      Conjunctiva/sclera: Conjunctivae normal.   Cardiovascular:      Rate and Rhythm: Normal rate and regular rhythm.      Heart sounds: Normal heart  sounds.   Pulmonary:      Effort: Pulmonary effort is normal.      Breath sounds: Normal breath sounds.   Musculoskeletal:         General: No swelling, tenderness or deformity.      Cervical back: Neck supple. No tenderness. No muscular tenderness.      Right lower leg: No edema.      Left lower leg: No edema.      Comments: Brace to the left lower extremity   Lymphadenopathy:      Cervical: No cervical adenopathy.      Upper Body:      Right upper body: No supraclavicular or axillary adenopathy.      Left upper body: No supraclavicular or axillary adenopathy.   Skin:     General: Skin is warm and dry.      Coloration: Skin is not jaundiced or pale.      Findings: No rash.   Neurological:      General: No focal deficit present.      Mental Status: He is alert and oriented to person, place, and time.      Motor: Motor function is intact. No weakness.      Gait: Gait is intact.   Psychiatric:         Mood and Affect: Mood normal.         Speech: Speech normal.         Behavior: Behavior normal. Behavior is cooperative.       LABS REVIEWED IN [facility]        Assessment:     1.  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  2.  Bailey 7 hormone sensitive metastatic prostate cancer with bone metastases diagnosed in May, 2015.  Previously followed by Dr. Heaton  3.  Bone pain secondary to #1.  well controlled.   4.  Hot flashes-- stable   5.  Chronic joint pain secondary to effects of Lupron and Xtandi        Plan:       At this time, the patient will continue on his Xtandi.      PSA stable and undetectable.     Continue to HOLD Xgeva due to exposed bone on the right side of his jaw-- follow up with oral surgery for further recommendations      He will continue Lupron every 6 months with Dr. Adams    Offered to get a scan of his head due to headaches lately, he declines at this time but will  call if these worsen.      Return to clinic in 3 months for OV and  clinical exam     Labs: CBC, CMP, and PSA     He knows to call with any new/worsening symptoms      Visit today included increased complexity associated with the care of the episodic problem metastatic prostate cancer, addressing and managing the longitudinal care of the patient's metastatic prostate cancer.       Olya Hurt, HAMILTONP-C

## 2025-05-19 DIAGNOSIS — C61 PROSTATE CA: ICD-10-CM

## 2025-05-19 DIAGNOSIS — C79.51 MALIGNANT NEOPLASM METASTATIC TO BONE: ICD-10-CM

## 2025-05-19 RX ORDER — ENZALUTAMIDE 80 MG/1
2 TABLET ORAL DAILY
Qty: 60 TABLET | Refills: 5 | Status: SHIPPED | OUTPATIENT
Start: 2025-05-19

## 2025-06-23 ENCOUNTER — HOSPITAL ENCOUNTER (EMERGENCY)
Facility: HOSPITAL | Age: 65
Discharge: HOME | End: 2025-06-23
Attending: EMERGENCY MEDICINE
Payer: COMMERCIAL

## 2025-06-23 VITALS
RESPIRATION RATE: 16 BRPM | SYSTOLIC BLOOD PRESSURE: 115 MMHG | TEMPERATURE: 98 F | HEART RATE: 88 BPM | HEIGHT: 72 IN | OXYGEN SATURATION: 99 % | BODY MASS INDEX: 30.48 KG/M2 | WEIGHT: 225 LBS | DIASTOLIC BLOOD PRESSURE: 82 MMHG

## 2025-06-23 DIAGNOSIS — M54.40 ACUTE MIDLINE LOW BACK PAIN WITH SCIATICA, SCIATICA LATERALITY UNSPECIFIED: Primary | ICD-10-CM

## 2025-06-23 DIAGNOSIS — Z87.39 HISTORY OF LEFT FOOT DROP: ICD-10-CM

## 2025-06-23 DIAGNOSIS — S32.030A CLOSED WEDGE COMPRESSION FRACTURE OF L3 VERTEBRA, INITIAL ENCOUNTER: ICD-10-CM

## 2025-06-23 PROCEDURE — 99284 EMERGENCY DEPT VISIT MOD MDM: CPT | Mod: 25

## 2025-06-23 PROCEDURE — 25000003 PHARM REV CODE 250: Performed by: PHYSICIAN ASSISTANT

## 2025-06-23 RX ORDER — METHOCARBAMOL 500 MG/1
1000 TABLET, FILM COATED ORAL
Status: COMPLETED | OUTPATIENT
Start: 2025-06-23 | End: 2025-06-23

## 2025-06-23 RX ORDER — METHOCARBAMOL 750 MG/1
750 TABLET, FILM COATED ORAL 4 TIMES DAILY
Qty: 40 TABLET | Refills: 0 | Status: SHIPPED | OUTPATIENT
Start: 2025-06-23 | End: 2025-07-03

## 2025-06-23 RX ORDER — HYDROCODONE BITARTRATE AND ACETAMINOPHEN 10; 325 MG/1; MG/1
1 TABLET ORAL
Refills: 0 | Status: COMPLETED | OUTPATIENT
Start: 2025-06-23 | End: 2025-06-23

## 2025-06-23 RX ORDER — HYDROCODONE BITARTRATE AND ACETAMINOPHEN 5; 325 MG/1; MG/1
1 TABLET ORAL EVERY 6 HOURS PRN
Qty: 12 TABLET | Refills: 0 | Status: SHIPPED | OUTPATIENT
Start: 2025-06-23

## 2025-06-23 RX ADMIN — METHOCARBAMOL 1000 MG: 500 TABLET ORAL at 02:06

## 2025-06-23 RX ADMIN — HYDROCODONE BITARTRATE AND ACETAMINOPHEN 1 TABLET: 10; 325 TABLET ORAL at 02:06

## 2025-06-23 NOTE — ED NOTES
Pt reports pain to rt thumb/hand, posterior neck (no point tenderness), posterior upper back pain & lower back pain that is worse on the left side.

## 2025-06-23 NOTE — ED PROVIDER NOTES
Encounter Date: 6/23/2025       History     Chief Complaint   Patient presents with    Fall     Fell on a boat 4 days ago. C/o back pain     See MDM for details.      The history is provided by the patient. No  was used.     Review of patient's allergies indicates:  No Known Allergies  Past Medical History:   Diagnosis Date    Arthritis     COPD (chronic obstructive pulmonary disease)     HTN (hypertension)     Malignant neoplasm metastatic to bone     Prostate CA      Past Surgical History:   Procedure Laterality Date    BACK SURGERY N/A     Lower back in 2000    FRACTURE SURGERY      INTRAMEDULLARY RODDING OF FEMUR Right 07/15/2001    PROSTATE BIOPSY N/A     SPINAL FUSION N/A     T4/T5 in April 2018     Family History   Family history unknown: Yes     Social History[1]  Review of Systems   Constitutional: Negative.  Negative for activity change, appetite change, diaphoresis, fatigue and fever.   HENT:  Negative for rhinorrhea and sinus pressure.    Eyes: Negative.    Respiratory: Negative.  Negative for chest tightness.    Cardiovascular:  Negative for chest pain.   Gastrointestinal: Negative.  Negative for abdominal distention and abdominal pain.   Endocrine: Negative.    Genitourinary: Negative.    Musculoskeletal:  Positive for arthralgias and back pain.   Allergic/Immunologic: Negative.    Neurological:  Negative for dizziness and headaches.   Hematological: Negative.    Psychiatric/Behavioral: Negative.     All other systems reviewed and are negative.      Physical Exam     Initial Vitals [06/23/25 1301]   BP Pulse Resp Temp SpO2   108/78 94 18 98.3 °F (36.8 °C) 99 %      MAP       --         Physical Exam    Nursing note and vitals reviewed.  Constitutional: He appears well-developed and well-nourished. He is cooperative. No distress.   HENT:   Head: Atraumatic. Not macrocephalic.   Right Ear: Tympanic membrane normal. Tympanic membrane is not erythematous.   Left Ear: Tympanic membrane  normal. Tympanic membrane is not erythematous.   Nose: No mucosal edema. Right sinus exhibits no frontal sinus tenderness. Left sinus exhibits no frontal sinus tenderness. Mouth/Throat: Mucous membranes are normal.   Cardiovascular:  Normal rate.           Pulmonary/Chest: Effort normal. No respiratory distress. He has no decreased breath sounds.   Musculoskeletal:         General: Normal range of motion.     Lymphadenopathy:        Head (right side): No submental adenopathy present.        Head (left side): No submental adenopathy present.   Neurological: He is alert and oriented to person, place, and time. He has normal strength. GCS score is 15. GCS eye subscore is 4. GCS verbal subscore is 5. GCS motor subscore is 6.   Skin: Skin is warm.   Psychiatric: He has a normal mood and affect. His behavior is normal. Judgment and thought content normal.         ED Course   Procedures  Labs Reviewed - No data to display       Imaging Results              MRI Lumbar Spine Without Contrast (Final result)  Result time 06/23/25 16:14:16      Final result by Florence Galdamez MD (06/23/25 16:14:16)                   Impression:      1. Acute L3 superior endplate compression fracture with mild loss of height.  No bony retropulsion.  2. Mild degenerative narrowing of the spinal canal at L1-L3.  3. Multilevel neural foraminal stenoses as described.      Electronically signed by: Florence Galdamez  Date:    06/23/2025  Time:    16:14               Narrative:    EXAMINATION:  MRI LUMBAR SPINE WITHOUT CONTRAST    CLINICAL HISTORY:  Low back pain, trauma;Lumbar radiculopathy, trauma;Back trauma, abnormal neuro exam, CT or xray positive (Age >= 16y);    TECHNIQUE:  Multiplanar multisequence MR images of the lumbar spine are obtained without contrast.    COMPARISON:  CT lumbar spine from the same day    FINDINGS:  There are 5 non-rib-bearing lumbar type vertebral bodies.  There are postoperative changes posterior spinal fusion  hardware at L4-5.  There has been a left-sided laminectomy at L4-5. There is an acute L3 superior endplate compression fracture with mild loss of height.  There is no bony retropulsion.  The remaining vertebral body heights are preserved.  There has been prior kyphoplasty at T12.    The conus terminates at the level of T12-L1.  It is normal in signal and contour.    Disc spaces, spinal canal and neural foramina are as follows:    L1-L2: Disc bulge and facet hypertrophy with mild narrowing of the spinal canal.  Mild bilateral neural foraminal stenosis.    L2-L3: Disc bulge and facet hypertrophy with mild narrowing of the spinal canal.  Mild bilateral neural foraminal stenosis.    L3-L4: Disc bulge and facet hypertrophy.  No significant spinal canal stenosis.  Mild bilateral neural foraminal stenosis.    L4-L5: Postoperative changes.  No significant spinal canal stenosis.  Mild right and moderate left neural foraminal stenosis.    L5-S1: No disc herniation.  Bilateral facet hypertrophy.  No significant spinal canal or neural foraminal stenosis.    No significant abnormality within the visualized paraspinous musculature.                                       CT Lumbar Spine Without Contrast (Final result)  Result time 06/23/25 14:41:50      Final result by Florence Galdamez MD (06/23/25 14:41:50)                   Impression:      Acute L3 superior endplate compression fracture with mild loss of height.  No bony retropulsion.      Electronically signed by: Florence Galdamez  Date:    06/23/2025  Time:    14:41               Narrative:    EXAMINATION:  CT LUMBAR SPINE WITHOUT CONTRAST    CLINICAL HISTORY:  Lumbar radiculopathy, symptoms persist with conservative treatment;    TECHNIQUE:  Noncontrast CT images of the lumbar spine. Axial, coronal, and sagittal reformatted images were obtained. Dose length product is 1003 mGycm. Automatic exposure control, adjustment of mA/kV or iterative reconstruction technique was  used to limit radiation dose.    COMPARISON:  MRI lumbar spine dated 07/05/2019, PET-CT dated 03/07/2024    FINDINGS:  There are 5 non-rib-bearing lumbar type vertebral bodies.  There are postoperative changes with posterior spinal fusion hardware at L4-5.  The hardware appears intact.  There is an L3 superior endplate compression fracture with mild loss of height.  There is no bony retropulsion.  The remaining vertebral body heights are preserved.  There are multilevel degenerative changes with disc height loss, marginal osteophyte formation and facet arthropathy.  There is no significant paraspinal hematoma.                                       Medications   HYDROcodone-acetaminophen  mg per tablet 1 tablet (1 tablet Oral Given 6/23/25 1411)   methocarbamoL tablet 1,000 mg (1,000 mg Oral Given 6/23/25 1411)     Medical Decision Making  64yoWM w/history of arthritis and hypertension with L4-5 back surgery in 2018 with Dr. Turner presents to the emergency department with low back pain after injury while on a boat [place] offshore 3days ago. Patient states he was in the boat elevated off the seat with a bump and then came down and hit his low back on the boat.  No other injury.  No head injury.  Patient now complaining of low back pain.  He does have a history of footdrop on the left side for which he wears and ATFL brace for.  He has increasing numbness and tingling in the lower extremities.  Denies any bowel or bladder incontinence.    Problems Addressed:  Acute midline low back pain with sciatica, sciatica laterality unspecified: acute illness or injury     Details: Differential diagnosis included but not limited to:  Low back pain, lumbar strain, lumbar fracture, cauda equina syndrome, other etiology     Patient has an acute L3 compression fracture noted on imaging.  Because of the patient's history with a chronic footdrop and increasing numbness or tingling, neurosurgery was consulted.  Neurosurgery  on-call, Dr. Burgos, recommends MRI of the lumbar spine without contrast.  He states without any evidence of acute no abnormalities other than fracture, he recommends conservative outpatient treatment with LSO brace.  MRI does not reveal any acute abnormalities other than compression fracture.  Patient given referral to outpatient neurosurgery.  Discussed this with Dr. Armijo, ER doctor, who is agreeable to plan. All questions asked and answered at the time of the visit. Strict ER precautions given. Patient and family members agreeable to plan.       Amount and/or Complexity of Data Reviewed  Radiology: ordered. Decision-making details documented in ED Course.    Risk  Prescription drug management.               ED Course as of 06/23/25 1953 Mon Jun 23, 2025   1623 MRI Lumbar Spine Without Contrast  Impression:     1. Acute L3 superior endplate compression fracture with mild loss of height.  No bony retropulsion.  2. Mild degenerative narrowing of the spinal canal at L1-L3.  3. Multilevel neural foraminal stenoses as described.        Electronically signed by:Florence Galdamez  Date:                                            06/23/2025  Time:                                           16:14      Exam Ended: 06/23/25 16:00 CDT Last Resulted: 06/23/25 16:14 CDT     [ST]      ED Course User Index  [ST] Pilar Fernandes PA                           Clinical Impression:  Final diagnoses:  [M54.40] Acute midline low back pain with sciatica, sciatica laterality unspecified (Primary)  [Z87.39] History of left foot drop  [S32.030A] Closed wedge compression fracture of L3 vertebra, initial encounter          ED Disposition Condition    Discharge Stable          ED Prescriptions       Medication Sig Dispense Start Date End Date Auth. Provider    HYDROcodone-acetaminophen (NORCO) 5-325 mg per tablet Take 1 tablet by mouth every 6 (six) hours as needed for Pain. 12 tablet 6/23/2025 -- Pilar Fernandes PA     methocarbamoL (ROBAXIN) 750 MG Tab Take 1 tablet (750 mg total) by mouth 4 (four) times daily. for 10 days 40 tablet 6/23/2025 7/3/2025 Pilar Fernandes PA          Follow-up Information       Follow up With Specialties Details Why Contact Info    Cameron Nichols MD Internal Medicine Call  As needed [address] [facility] [address]  [phone]      [facility] - Emergency Dept Emergency Medicine  As needed, If symptoms worsen [address]  [facility] [id number] [phone]    Emiliano Pierson MD Neurosurgery Schedule an appointment as soon as possible for a visit   [address]  [place] [id number] [phone]              This note was typed partially using voice recognition software.  Please be reminded that not all corrections/addendums to grammar may have been made prior to closing of this chart.         [1]   Social History  Tobacco Use    Smoking status: Former     Current packs/day: 0.00     Average packs/day: 1.5 packs/day for 35.0 years (52.5 ttl pk-yrs)     Types: Cigarettes     Start date: 1/1/1980     Quit date: 1/1/2015     Years since quitting: 10.4    Smokeless tobacco: Never    Tobacco comments:     Quit in 2016   Substance Use Topics    Alcohol use: Yes    Drug use: Never        Pilar Fernandes PA  06/23/25 1953

## 2025-06-23 NOTE — ED NOTES
Called MRI, no answer, spoke w/radiology dept & they say Tatianna has a pt on the MRI table, requested they notify me when she's done w/that patient to see if it would be possible to work in Mr. Brito.

## 2025-06-30 ENCOUNTER — TELEPHONE (OUTPATIENT)
Dept: NEUROSURGERY | Facility: CLINIC | Age: 65
End: 2025-06-30
Payer: COMMERCIAL

## 2025-06-30 DIAGNOSIS — S32.030D CLOSED COMPRESSION FRACTURE OF L3 LUMBAR VERTEBRA WITH ROUTINE HEALING, SUBSEQUENT ENCOUNTER: Primary | ICD-10-CM

## 2025-06-30 NOTE — TELEPHONE ENCOUNTER
I called and spoke to patient to schedule hospital follow up. Patient is to follow up in 4 weeks with repeat XR. I scheduled patient on 7/24 at 11:00 with Mayra LIMON and XR at [facility] for 10:00. Patient verbalized understanding of appointment date and times.

## 2025-06-30 NOTE — TELEPHONE ENCOUNTER
Patient called to schedule a hospital follow up.  He was seen at the ER at [facility] on 6/23/25 after a fall on a boat.  Imaging showed L3 compression fracture.  ER physician spoke with Dr. Burgos and treatment with LSO brace was recommended.  He is wearing the LSO brace as instructed.  He will need a follow up with [facility] 4 weeks from date of ER visit with repeat xrays.  Orders are in chart.  I confirmed that the home number in the chart is the best number to reach him.
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
## 2025-07-24 ENCOUNTER — OFFICE VISIT (OUTPATIENT)
Dept: NEUROSURGERY | Facility: CLINIC | Age: 65
End: 2025-07-24
Payer: COMMERCIAL

## 2025-07-24 ENCOUNTER — HOSPITAL ENCOUNTER (OUTPATIENT)
Dept: RADIOLOGY | Facility: HOSPITAL | Age: 65
Discharge: HOME | End: 2025-07-24
Attending: NURSE PRACTITIONER
Payer: COMMERCIAL

## 2025-07-24 ENCOUNTER — HOSPITAL ENCOUNTER (OUTPATIENT)
Dept: RADIOLOGY | Facility: HOSPITAL | Age: 65
Discharge: HOME | End: 2025-07-24
Attending: STUDENT IN AN ORGANIZED HEALTH CARE EDUCATION/TRAINING PROGRAM
Payer: COMMERCIAL

## 2025-07-24 VITALS
HEIGHT: 72 IN | HEART RATE: 80 BPM | RESPIRATION RATE: 16 BRPM | DIASTOLIC BLOOD PRESSURE: 76 MMHG | WEIGHT: 225.63 LBS | BODY MASS INDEX: 30.56 KG/M2 | SYSTOLIC BLOOD PRESSURE: 109 MMHG

## 2025-07-24 DIAGNOSIS — M54.12 CERVICAL RADICULOPATHY: ICD-10-CM

## 2025-07-24 DIAGNOSIS — M21.372 LEFT FOOT DROP: ICD-10-CM

## 2025-07-24 DIAGNOSIS — S32.030D CLOSED COMPRESSION FRACTURE OF L3 LUMBAR VERTEBRA WITH ROUTINE HEALING, SUBSEQUENT ENCOUNTER: Primary | ICD-10-CM

## 2025-07-24 DIAGNOSIS — M54.40 ACUTE MIDLINE LOW BACK PAIN WITH SCIATICA, SCIATICA LATERALITY UNSPECIFIED: ICD-10-CM

## 2025-07-24 DIAGNOSIS — S32.030A CLOSED WEDGE COMPRESSION FRACTURE OF L3 VERTEBRA, INITIAL ENCOUNTER: ICD-10-CM

## 2025-07-24 DIAGNOSIS — S32.030D CLOSED COMPRESSION FRACTURE OF L3 LUMBAR VERTEBRA WITH ROUTINE HEALING, SUBSEQUENT ENCOUNTER: ICD-10-CM

## 2025-07-24 PROCEDURE — 99204 OFFICE O/P NEW MOD 45 MIN: CPT | Mod: ,,, | Performed by: NURSE PRACTITIONER

## 2025-07-24 PROCEDURE — 3078F DIAST BP <80 MM HG: CPT | Mod: CPTII,,, | Performed by: NURSE PRACTITIONER

## 2025-07-24 PROCEDURE — 3008F BODY MASS INDEX DOCD: CPT | Mod: CPTII,,, | Performed by: NURSE PRACTITIONER

## 2025-07-24 PROCEDURE — 3074F SYST BP LT 130 MM HG: CPT | Mod: CPTII,,, | Performed by: NURSE PRACTITIONER

## 2025-07-24 PROCEDURE — 72100 X-RAY EXAM L-S SPINE 2/3 VWS: CPT | Mod: TC

## 2025-07-24 PROCEDURE — 1159F MED LIST DOCD IN RCRD: CPT | Mod: CPTII,,, | Performed by: NURSE PRACTITIONER

## 2025-07-24 PROCEDURE — 1160F RVW MEDS BY RX/DR IN RCRD: CPT | Mod: CPTII,,, | Performed by: NURSE PRACTITIONER

## 2025-07-24 PROCEDURE — 72052 X-RAY EXAM NECK SPINE 6/>VWS: CPT | Mod: TC

## 2025-07-24 PROCEDURE — 4010F ACE/ARB THERAPY RXD/TAKEN: CPT | Mod: CPTII,,, | Performed by: NURSE PRACTITIONER

## 2025-07-24 PROCEDURE — 3044F HG A1C LEVEL LT 7.0%: CPT | Mod: CPTII,,, | Performed by: NURSE PRACTITIONER

## 2025-08-19 ENCOUNTER — OFFICE VISIT (OUTPATIENT)
Dept: HEMATOLOGY/ONCOLOGY | Facility: CLINIC | Age: 65
End: 2025-08-19
Payer: COMMERCIAL

## 2025-08-19 ENCOUNTER — LAB VISIT (OUTPATIENT)
Dept: LAB | Facility: HOSPITAL | Age: 65
End: 2025-08-19
Attending: NURSE PRACTITIONER
Payer: COMMERCIAL

## 2025-08-19 VITALS
HEIGHT: 72 IN | RESPIRATION RATE: 18 BRPM | TEMPERATURE: 98 F | DIASTOLIC BLOOD PRESSURE: 85 MMHG | WEIGHT: 226.88 LBS | OXYGEN SATURATION: 98 % | BODY MASS INDEX: 30.73 KG/M2 | SYSTOLIC BLOOD PRESSURE: 122 MMHG | HEART RATE: 76 BPM

## 2025-08-19 DIAGNOSIS — C61 PROSTATE CA: Primary | ICD-10-CM

## 2025-08-19 DIAGNOSIS — C61 PROSTATE CA: ICD-10-CM

## 2025-08-19 DIAGNOSIS — Z79.818 ENCOUNTER FOR MONITORING ANDROGEN DEPRIVATION THERAPY: ICD-10-CM

## 2025-08-19 DIAGNOSIS — C79.51 MALIGNANT NEOPLASM METASTATIC TO BONE: ICD-10-CM

## 2025-08-19 LAB
ALBUMIN SERPL-MCNC: 4 G/DL (ref 3.4–4.8)
ALBUMIN/GLOB SERPL: 1.2 RATIO (ref 1.1–2)
ALP SERPL-CCNC: 48 UNIT/L (ref 40–150)
ALT SERPL-CCNC: 18 UNIT/L (ref 0–55)
ANION GAP SERPL CALC-SCNC: 10 MEQ/L
AST SERPL-CCNC: 21 UNIT/L (ref 11–45)
BASOPHILS # BLD AUTO: 0.03 X10(3)/MCL
BASOPHILS NFR BLD AUTO: 0.5 %
BILIRUB SERPL-MCNC: 0.5 MG/DL
BUN SERPL-MCNC: 20.6 MG/DL (ref 8.4–25.7)
CALCIUM SERPL-MCNC: 10.2 MG/DL (ref 8.8–10)
CHLORIDE SERPL-SCNC: 104 MMOL/L (ref 98–107)
CO2 SERPL-SCNC: 28 MMOL/L (ref 23–31)
CREAT SERPL-MCNC: 0.95 MG/DL (ref 0.72–1.25)
CREAT/UREA NIT SERPL: 22
EOSINOPHIL # BLD AUTO: 0.3 X10(3)/MCL (ref 0–0.9)
EOSINOPHIL NFR BLD AUTO: 5.2 %
ERYTHROCYTE [DISTWIDTH] IN BLOOD BY AUTOMATED COUNT: 12.8 % (ref 11.5–17)
GFR SERPLBLD CREATININE-BSD FMLA CKD-EPI: >60 ML/MIN/1.73/M2
GLOBULIN SER-MCNC: 3.4 GM/DL (ref 2.4–3.5)
GLUCOSE SERPL-MCNC: 94 MG/DL (ref 82–115)
HCT VFR BLD AUTO: 41.4 % (ref 42–52)
HGB BLD-MCNC: 14.2 G/DL (ref 14–18)
IMM GRANULOCYTES # BLD AUTO: 0.01 X10(3)/MCL (ref 0–0.04)
IMM GRANULOCYTES NFR BLD AUTO: 0.2 %
LYMPHOCYTES # BLD AUTO: 2.4 X10(3)/MCL (ref 0.6–4.6)
LYMPHOCYTES NFR BLD AUTO: 41.5 %
MCH RBC QN AUTO: 32 PG (ref 27–31)
MCHC RBC AUTO-ENTMCNC: 34.3 G/DL (ref 33–36)
MCV RBC AUTO: 93.2 FL (ref 80–94)
MONOCYTES # BLD AUTO: 0.51 X10(3)/MCL (ref 0.1–1.3)
MONOCYTES NFR BLD AUTO: 8.8 %
NEUTROPHILS # BLD AUTO: 2.54 X10(3)/MCL (ref 2.1–9.2)
NEUTROPHILS NFR BLD AUTO: 43.8 %
PLATELET # BLD AUTO: 184 X10(3)/MCL (ref 130–400)
PMV BLD AUTO: 8.8 FL (ref 7.4–10.4)
POTASSIUM SERPL-SCNC: 4.6 MMOL/L (ref 3.5–5.1)
PROT SERPL-MCNC: 7.4 GM/DL (ref 5.8–7.6)
PSA SERPL-MCNC: <0.1 NG/ML
RBC # BLD AUTO: 4.44 X10(6)/MCL (ref 4.7–6.1)
SODIUM SERPL-SCNC: 142 MMOL/L (ref 136–145)
WBC # BLD AUTO: 5.79 X10(3)/MCL (ref 4.5–11.5)

## 2025-08-19 PROCEDURE — 1159F MED LIST DOCD IN RCRD: CPT | Mod: CPTII,S$GLB,, | Performed by: NURSE PRACTITIONER

## 2025-08-19 PROCEDURE — 4010F ACE/ARB THERAPY RXD/TAKEN: CPT | Mod: CPTII,S$GLB,, | Performed by: NURSE PRACTITIONER

## 2025-08-19 PROCEDURE — 85025 COMPLETE CBC W/AUTO DIFF WBC: CPT

## 2025-08-19 PROCEDURE — G2211 COMPLEX E/M VISIT ADD ON: HCPCS | Mod: S$GLB,,, | Performed by: NURSE PRACTITIONER

## 2025-08-19 PROCEDURE — 80053 COMPREHEN METABOLIC PANEL: CPT

## 2025-08-19 PROCEDURE — 3008F BODY MASS INDEX DOCD: CPT | Mod: CPTII,S$GLB,, | Performed by: NURSE PRACTITIONER

## 2025-08-19 PROCEDURE — 36415 COLL VENOUS BLD VENIPUNCTURE: CPT

## 2025-08-19 PROCEDURE — 1160F RVW MEDS BY RX/DR IN RCRD: CPT | Mod: CPTII,S$GLB,, | Performed by: NURSE PRACTITIONER

## 2025-08-19 PROCEDURE — 3074F SYST BP LT 130 MM HG: CPT | Mod: CPTII,S$GLB,, | Performed by: NURSE PRACTITIONER

## 2025-08-19 PROCEDURE — 3044F HG A1C LEVEL LT 7.0%: CPT | Mod: CPTII,S$GLB,, | Performed by: NURSE PRACTITIONER

## 2025-08-19 PROCEDURE — 84153 ASSAY OF PSA TOTAL: CPT

## 2025-08-19 PROCEDURE — 99215 OFFICE O/P EST HI 40 MIN: CPT | Mod: S$GLB,,, | Performed by: NURSE PRACTITIONER

## 2025-08-19 PROCEDURE — 3079F DIAST BP 80-89 MM HG: CPT | Mod: CPTII,S$GLB,, | Performed by: NURSE PRACTITIONER

## 2025-08-19 PROCEDURE — 99999 PR PBB SHADOW E&M-EST. PATIENT-LVL V: CPT | Mod: PBBFAC,,, | Performed by: NURSE PRACTITIONER

## 2025-08-21 ENCOUNTER — HOSPITAL ENCOUNTER (OUTPATIENT)
Dept: RADIOLOGY | Facility: HOSPITAL | Age: 65
Discharge: HOME | End: 2025-08-21
Attending: NURSE PRACTITIONER
Payer: COMMERCIAL

## 2025-08-21 ENCOUNTER — OFFICE VISIT (OUTPATIENT)
Dept: NEUROSURGERY | Facility: CLINIC | Age: 65
End: 2025-08-21
Payer: COMMERCIAL

## 2025-08-21 VITALS
DIASTOLIC BLOOD PRESSURE: 74 MMHG | HEART RATE: 76 BPM | SYSTOLIC BLOOD PRESSURE: 105 MMHG | BODY MASS INDEX: 30.64 KG/M2 | HEIGHT: 72 IN | WEIGHT: 226.19 LBS | RESPIRATION RATE: 16 BRPM

## 2025-08-21 DIAGNOSIS — S32.030D CLOSED COMPRESSION FRACTURE OF L3 LUMBAR VERTEBRA WITH ROUTINE HEALING, SUBSEQUENT ENCOUNTER: Primary | ICD-10-CM

## 2025-08-21 DIAGNOSIS — S32.030D CLOSED COMPRESSION FRACTURE OF L3 LUMBAR VERTEBRA WITH ROUTINE HEALING, SUBSEQUENT ENCOUNTER: ICD-10-CM

## 2025-08-21 DIAGNOSIS — M47.812 CERVICAL SPONDYLOSIS: ICD-10-CM

## 2025-08-21 PROBLEM — S32.030A CLOSED COMPRESSION FRACTURE OF THIRD LUMBAR VERTEBRA: Status: ACTIVE | Noted: 2025-08-21

## 2025-08-21 PROCEDURE — 72100 X-RAY EXAM L-S SPINE 2/3 VWS: CPT | Mod: TC

## 2025-08-21 PROCEDURE — 1160F RVW MEDS BY RX/DR IN RCRD: CPT | Mod: CPTII,,, | Performed by: NURSE PRACTITIONER

## 2025-08-21 PROCEDURE — 3078F DIAST BP <80 MM HG: CPT | Mod: CPTII,,, | Performed by: NURSE PRACTITIONER

## 2025-08-21 PROCEDURE — 3008F BODY MASS INDEX DOCD: CPT | Mod: CPTII,,, | Performed by: NURSE PRACTITIONER

## 2025-08-21 PROCEDURE — 3044F HG A1C LEVEL LT 7.0%: CPT | Mod: CPTII,,, | Performed by: NURSE PRACTITIONER

## 2025-08-21 PROCEDURE — 4010F ACE/ARB THERAPY RXD/TAKEN: CPT | Mod: CPTII,,, | Performed by: NURSE PRACTITIONER

## 2025-08-21 PROCEDURE — 99215 OFFICE O/P EST HI 40 MIN: CPT | Mod: ,,, | Performed by: NURSE PRACTITIONER

## 2025-08-21 PROCEDURE — 1159F MED LIST DOCD IN RCRD: CPT | Mod: CPTII,,, | Performed by: NURSE PRACTITIONER

## 2025-08-21 PROCEDURE — 3074F SYST BP LT 130 MM HG: CPT | Mod: CPTII,,, | Performed by: NURSE PRACTITIONER

## 2025-08-27 ENCOUNTER — HOSPITAL ENCOUNTER (OUTPATIENT)
Dept: RADIOLOGY | Facility: HOSPITAL | Age: 65
Discharge: HOME | End: 2025-08-27
Attending: NURSE PRACTITIONER
Payer: COMMERCIAL

## 2025-08-27 DIAGNOSIS — Z79.818 ENCOUNTER FOR MONITORING ANDROGEN DEPRIVATION THERAPY: ICD-10-CM

## 2025-08-27 DIAGNOSIS — C61 PROSTATE CA: ICD-10-CM

## 2025-08-27 DIAGNOSIS — C79.51 MALIGNANT NEOPLASM METASTATIC TO BONE: ICD-10-CM

## 2025-08-27 PROCEDURE — 78815 PET IMAGE W/CT SKULL-THIGH: CPT | Mod: TC,PS

## 2025-08-27 PROCEDURE — A9596 HC GALLIUM GA-68 GOZETOTIDE, DX (ILLUCCIX), PER 1 MCI: HCPCS | Mod: TB | Performed by: NURSE PRACTITIONER

## 2025-08-27 RX ADMIN — KIT FOR THE PREPARATION OF GALLIUM GA 68 GOZETOTIDE INJECTION 5.3 MILLICURIE: KIT INTRAVENOUS at 06:08
